# Patient Record
Sex: MALE | Race: WHITE | NOT HISPANIC OR LATINO | Employment: FULL TIME | URBAN - METROPOLITAN AREA
[De-identification: names, ages, dates, MRNs, and addresses within clinical notes are randomized per-mention and may not be internally consistent; named-entity substitution may affect disease eponyms.]

---

## 2017-08-03 ENCOUNTER — TRANSFERRED RECORDS (OUTPATIENT)
Dept: HEALTH INFORMATION MANAGEMENT | Facility: CLINIC | Age: 57
End: 2017-08-03

## 2017-10-17 ENCOUNTER — PRE VISIT (OUTPATIENT)
Dept: UROLOGY | Facility: CLINIC | Age: 57
End: 2017-10-17

## 2017-10-23 ENCOUNTER — ALLIED HEALTH/NURSE VISIT (OUTPATIENT)
Dept: UROLOGY | Facility: CLINIC | Age: 57
End: 2017-10-23

## 2017-10-23 ENCOUNTER — OFFICE VISIT (OUTPATIENT)
Dept: UROLOGY | Facility: CLINIC | Age: 57
End: 2017-10-23

## 2017-10-23 VITALS
HEIGHT: 72 IN | WEIGHT: 214 LBS | SYSTOLIC BLOOD PRESSURE: 140 MMHG | BODY MASS INDEX: 28.99 KG/M2 | DIASTOLIC BLOOD PRESSURE: 81 MMHG | HEART RATE: 89 BPM

## 2017-10-23 DIAGNOSIS — N39.3 MALE URINARY STRESS INCONTINENCE: Primary | ICD-10-CM

## 2017-10-23 RX ORDER — LOSARTAN POTASSIUM 50 MG/1
TABLET ORAL DAILY
COMMUNITY

## 2017-10-23 RX ORDER — CEFAZOLIN SODIUM 1 G/3ML
1 INJECTION, POWDER, FOR SOLUTION INTRAMUSCULAR; INTRAVENOUS SEE ADMIN INSTRUCTIONS
Status: CANCELLED | OUTPATIENT
Start: 2017-10-23

## 2017-10-23 RX ORDER — SUMATRIPTAN 50 MG/1
TABLET, FILM COATED ORAL
COMMUNITY

## 2017-10-23 RX ORDER — IBUPROFEN 400 MG/1
TABLET, FILM COATED ORAL
COMMUNITY

## 2017-10-23 ASSESSMENT — PAIN SCALES - GENERAL: PAINLEVEL: NO PAIN (0)

## 2017-10-23 NOTE — PROGRESS NOTES
NEW CONSULT FOR MALE URINARY INCONTINENCE    Name: Jesus Landa    MRN: [id number]   YOB: 1960                 Chief Complaint:   Urinary Incontinence          History of Present Illness:   Mr. Jesus Landa is a 57 year old male seen in consultation from Dr. Sparks for urinary incontinence secondary to robotic radical prostatectomy on 2/1/13 with Dr. Sparks (path: pT2cN0) and salvage EBRT 1.5 years later. PSA is nondetectable. Incontinence is Stress. He manages the incontinence with 3 large pads (he uses towels) per day. Patient does not need to void by Crede or straining. He is not dry at night. He can not voluntarily interrupt his stream when urinating in a toilet. Activities which exacerbate incontinence include bending, coughing, lifting, standing, walking, laughing, sneezing. Previous surgeries for incontinence include none. There is not a history of bladder neck contracture. His expectations for incontinence after treatment with us are: to be improved but not dry.     Although he goes through 2-3 shop towels per day, he says that it would be much worse if he did not purposely dehydrate himself. So, he characterizes his leakage as at least moderate if not severe.         Past Medical History:     Past Medical History:   Diagnosis Date     Colon polyps      Diverticulosis      Hyperlipidemia      Hypertension      Left cornea abrasion      Prostate cancer (H)      Tympanic membrane perforation             Past Surgical History:     Past Surgical History:   Procedure Laterality Date     amputation of index finger[      with neurectomy each right finger tip     C ORAL SURGERY PROCEDURE       CHOLECYSTECTOMY       COLONOSCOPY       DAVINCI PROSTATECTOMY  2/1/2013    Procedure: DAVINCI PROSTATECTOMY;  Davinci Radical  Prostatectomy, Bilateral Pelvic Lymhadenectomy  ;  Surgeon: Idania Sparks MD;  Location: [facility] OR      COLONOSCOPY THRU STOMA W BIOPSY/CAUTERY TUMOR/POLYP/LESION              Social  History:     Social History   Substance Use Topics     Smoking status: Never Smoker     Smokeless tobacco: Never Used     Alcohol use No            Family History:   No family history on file.         Allergies:   No Known Allergies         Medications:     Current Outpatient Prescriptions   Medication Sig     sildenafil (VIAGRA) 100 MG tablet Take 1/2 tab three times a week     oxyCODONE-acetaminophen (PERCOCET) 5-325 MG per tablet Take 1-2 tablets by mouth every 4 hours as needed.     sennosides (SENOKOT) 8.6 MG tablet Take 1 tablet by mouth 2 times daily as needed for constipation.     aspirin 81 MG tablet Take  by mouth daily.     No current facility-administered medications for this visit.              Review of Systems:    ROS: 14 point ROS neg other than the symptoms noted above in the HPI.          Physical Exam:   B/P: Data Unavailable, T: Data Unavailable, P: Data Unavailable, R: Data Unavailable  Estimated body mass index is 28.94 kg/(m^2) as calculated from the following:    Height as of 3/20/13: 1.829 m (6').    Weight as of 3/20/13: 96.8 kg (213 lb 6.4 oz).  General: age-appropriate appearing male in NAD. thin body habitus.  HEENT: Head AT/NC, EOMI, CN Grossly intact  Resp: no respiratory distress  Back: bony spine is non-tender, flanks are nontender  Abdomen: not obese, soft, non-distended, non-tender. No organomegaly  : testicles normal without atrophy or masses  Rectal exam: not done  LE: no edema.   Neuro: grossly intact  Motor: excellent strength throughout  Skin: clear of rashes or ecchymoses.        Labs:    All laboratory data reviewed with patient  Significant for none      Office Studies:           Imaging:    All imaging reviewed with patient.  Significant for none      Outside records:    I spent 10 minutes reviewing outside records.         Assessment and Plan:   57 year old male with male stress urinary incontinence .  He understands the risks to include but not be limited to bleeding,  infection, penile or scrotal pain/numbness, AUS infection, cuff erosion, urinary retention, sub-suff atrophy, persistent incontinence and need for additional procedures. He understands there is a 25-40% 10-year revision rate. He understands I consult for AMS. He wishes to proceed.    No orders of the defined types were placed in this encounter.    Patient was seen and examined with Dr. Stephen Mercado MD  Urology Resident     =======================================================  As the attending surgeon I, Rm Mitchell, interviewed and examined the patient. The plan was developed between me and the patient. My findings and plan are as stated by the resident.    We will need to do a cysto at the start of the case.     Rm Mitchell MD

## 2017-10-23 NOTE — MR AVS SNAPSHOT
After Visit Summary   10/23/2017    Jesus Landa    MRN: [id number]           Patient Information     Date Of Birth          1960        Visit Information        Provider Department      10/23/2017 12:00 PM Nurse, [facility] and [facility] for Prostate and Urologic Cancers        Today's Diagnoses     Male urinary stress incontinence    -  1       Follow-ups after your visit        Your next 10 appointments already scheduled     Dec 15, 2017   Procedure with Rm Mitchell MD   [facility] ([facility] [place])    [address] [place] [address]  5th [facility] [address] [phone]           Located in the Clinics and Surgery Center at [address] [place] [address].   parking is very convenient and highly recommended.  is a $6 flat rate fee.  Both  and self parkers should enter the main arrival plaza from [facility]; parking attendants will direct you based on your parking preference.            Jan 08, 2018  1:00 PM CST   (Arrive by 12:45 PM)   Post-Op with Boy Aly MD   [facility] and [facility] for Prostate and Urologic Cancers ([facility])    [address] [place] [address]  4th [facility] [address] [phone]              Who to contact     Please call your clinic at [phone] to:    Ask questions about your health    Make or cancel appointments    Discuss your medicines    Learn about your test results    Speak to your doctor   If you have compliments or concerns about an experience at your clinic, or if you wish to file a complaint, please contact [facility] Patient Relations at [phone] or email us at [email or web address]         Additional Information About Your Visit        MyChart Information     [employer] gives you secure access to your electronic health record. If you see a primary care provider, you can  also send messages to your care team and make appointments. If you have questions, please call your primary care clinic.  If you do not have a primary care provider, please call [phone] and they will assist you.      [employer] is an electronic gateway that provides easy, online access to your medical records. With [employer], you can request a clinic appointment, read your test results, renew a prescription or communicate with your care team.     To access your existing account, please contact your [facility] or call [phone] for assistance.        Care EveryWhere ID     This is your Care EveryWhere ID. This could be used by other organizations to access your [place] medical records  [id number]         Blood Pressure from Last 3 Encounters:   10/23/17 140/81   03/20/13 139/71   02/20/13 145/82    Weight from Last 3 Encounters:   10/23/17 97.1 kg (214 lb)   03/20/13 96.8 kg (213 lb 6.4 oz)   02/20/13 96.4 kg (212 lb 9.6 oz)              Today, you had the following     No orders found for display         Today's Medication Changes          These changes are accurate as of: 10/23/17  6:17 PM.  If you have any questions, ask your nurse or doctor.               Stop taking these medicines if you haven't already. Please contact your care team if you have questions.     oxyCODONE-acetaminophen 5-325 MG per tablet   Commonly known as:  PERCOCET   Stopped by:  Rm Mitchell MD           sennosides 8.6 MG tablet   Commonly known as:  SENOKOT   Stopped by:  Rm Mitchell MD           sildenafil 100 MG tablet   Commonly known as:  VIAGRA   Stopped by:  Rm Mitchell MD                    Primary Care Provider Office Phone # Fax #    [facility] [phone]       [address] [place] [address]        Equal Access to Services     AMANDA CHO AH: Abhinav Noble, sydni watson, qafortino johnson,  didier cadet maggy gupta'aan ah. So [facility] [phone].    ATENCIÓN: Si habla yeni, tiene a plata disposición servicios gratuitos de asistencia lingüística. Tanner al [phone].    We comply with applicable federal civil rights laws and [place] laws. We do not discriminate on the basis of race, color, national origin, age, disability, sex, sexual orientation, or gender identity.            Thank you!     Thank you for choosing [facility] UROLOGY AND [facility] FOR PROSTATE AND UROLOGIC CANCERS  for your care. Our goal is always to provide you with excellent care. Hearing back from our patients is one way we can continue to improve our services. Please take a few minutes to complete the written survey that you may receive in the mail after your visit with us. Thank you!             Your Updated Medication List - Protect others around you: Learn how to safely use, store and throw away your medicines at www.disposemymeds.org.          This list is accurate as of: 10/23/17  6:17 PM.  Always use your most recent med list.                   Brand Name Dispense Instructions for use Diagnosis    aspirin 81 MG tablet      Take  by mouth daily.        CENTRUM SILVER ULTRA MENS PO           ibuprofen 400 MG tablet    ADVIL/MOTRIN          losartan 50 MG tablet    COZAAR          SUMAtriptan 50 MG tablet    IMITREX          TYLENOL 325 MG Caps   Generic drug:  Acetaminophen

## 2017-10-23 NOTE — PATIENT INSTRUCTIONS
Schedule surgery with Dr. Mitchell.    It was a pleasure meeting with you today.  Thank you for allowing me and my team the privilege of caring for you today.  YOU are the reason we are here, and I truly hope we provided you with the excellent service you deserve.  Please let us know if there is anything else we can do for you so that we can be sure you are leaving completely satisfied with your care experience.

## 2017-10-23 NOTE — LETTER
10/23/2017       RE: Jesus Landa  [address] [address] [address]     Dear Colleague,    Thank you for referring your patient, Jesus Landa, to the [facility] UROLOGY AND INST FOR PROSTATE AND UROLOGIC CANCERS at [facility]. Please see a copy of my visit note below.    NEW CONSULT FOR MALE URINARY INCONTINENCE    Name: Jesus Landa    MRN: [id number]   YOB: 1960                 Chief Complaint:   Urinary Incontinence          History of Present Illness:   Mr. Jesus Landa is a 57 year old male seen in consultation from Dr. Sparks for urinary incontinence secondary to robotic radical prostatectomy on 2/1/13 with Dr. Sparks (path: pT2cN0) and salvage EBRT 1.5 years later. PSA is nondetectable. Incontinence is Stress. He manages the incontinence with 3 large pads (he uses towels) per day. Patient does not need to void by Crede or straining. He is not dry at night. He can not voluntarily interrupt his stream when urinating in a toilet. Activities which exacerbate incontinence include bending, coughing, lifting, standing, walking, laughing, sneezing. Previous surgeries for incontinence include none. There is not a history of bladder neck contracture. His expectations for incontinence after treatment with us are: to be improved but not dry.     Although he goes through 2-3 shop towels per day, he says that it would be much worse if he did not purposely dehydrate himself. So, he characterizes his leakage as at least moderate if not severe.         Past Medical History:     Past Medical History:   Diagnosis Date     Colon polyps      Diverticulosis      Hyperlipidemia      Hypertension      Left cornea abrasion      Prostate cancer (H)      Tympanic membrane perforation             Past Surgical History:     Past Surgical History:   Procedure Laterality Date     amputation of index finger[      with neurectomy each right finger tip     C ORAL SURGERY PROCEDURE        CHOLECYSTECTOMY       COLONOSCOPY       DAVINCI PROSTATECTOMY  2/1/2013    Procedure: DAVINCI PROSTATECTOMY;  Davinci Radical  Prostatectomy, Bilateral Pelvic Lymhadenectomy  ;  Surgeon: Idania Sparks MD;  Location: [facility] OR      COLONOSCOPY THRU STOMA W BIOPSY/CAUTERY TUMOR/POLYP/LESION              Social History:     Social History   Substance Use Topics     Smoking status: Never Smoker     Smokeless tobacco: Never Used     Alcohol use No            Family History:   No family history on file.         Allergies:   No Known Allergies         Medications:     Current Outpatient Prescriptions   Medication Sig     sildenafil (VIAGRA) 100 MG tablet Take 1/2 tab three times a week     oxyCODONE-acetaminophen (PERCOCET) 5-325 MG per tablet Take 1-2 tablets by mouth every 4 hours as needed.     sennosides (SENOKOT) 8.6 MG tablet Take 1 tablet by mouth 2 times daily as needed for constipation.     aspirin 81 MG tablet Take  by mouth daily.     No current facility-administered medications for this visit.              Review of Systems:    ROS: 14 point ROS neg other than the symptoms noted above in the HPI.          Physical Exam:   B/P: Data Unavailable, T: Data Unavailable, P: Data Unavailable, R: Data Unavailable  Estimated body mass index is 28.94 kg/(m^2) as calculated from the following:    Height as of 3/20/13: 1.829 m (6').    Weight as of 3/20/13: 96.8 kg (213 lb 6.4 oz).  General: age-appropriate appearing male in NAD. thin body habitus.  HEENT: Head AT/NC, EOMI, CN Grossly intact  Resp: no respiratory distress  Back: bony spine is non-tender, flanks are nontender  Abdomen: not obese, soft, non-distended, non-tender. No organomegaly  : testicles normal without atrophy or masses  Rectal exam: not done  LE: no edema.   Neuro: grossly intact  Motor: excellent strength throughout  Skin: clear of rashes or ecchymoses.        Labs:    All laboratory data reviewed with patient  Significant for none       Office Studies:           Imaging:    All imaging reviewed with patient.  Significant for none      Outside records:    I spent 10 minutes reviewing outside records.         Assessment and Plan:   57 year old male with male stress urinary incontinence .  He understands the risks to include but not be limited to bleeding, infection, penile or scrotal pain/numbness, AUS infection, cuff erosion, urinary retention, sub-suff atrophy, persistent incontinence and need for additional procedures. He understands there is a 25-40% 10-year revision rate. He understands I consult for AMS. He wishes to proceed.    No orders of the defined types were placed in this encounter.    Patient was seen and examined with Dr. Stephen Mercado MD  Urology Resident     =======================================================  As the attending surgeon I, Rm Mitchell, interviewed and examined the patient. The plan was developed between me and the patient. My findings and plan are as stated by the resident.    We will need to do a cysto at the start of the case.     Rm Mitchell MD

## 2017-10-23 NOTE — NURSING NOTE
Chief Complaint   Patient presents with     RECHECK     Patient with incontinence coming in for a consult.     Polina Trevizo

## 2017-10-23 NOTE — MR AVS SNAPSHOT
After Visit Summary   10/23/2017    Jesus Landa    MRN: [id number]           Patient Information     Date Of Birth          1960        Visit Information        Provider Department      10/23/2017 10:30 AM Rm Mitchell MD [facility] Urology and [facility] for Prostate and Urologic Cancers        Today's Diagnoses     Male urinary stress incontinence    -  1      Care Instructions    Schedule surgery with Dr. Mitchell.    It was a pleasure meeting with you today.  Thank you for allowing me and my team the privilege of caring for you today.  YOU are the reason we are here, and I truly hope we provided you with the excellent service you deserve.  Please let us know if there is anything else we can do for you so that we can be sure you are leaving completely satisfied with your care experience.              Follow-ups after your visit        Your next 10 appointments already scheduled     Dec 15, 2017   Procedure with Rm Mitchell MD   [facility] ([facility] [place])    [address] [place] [address]  5th [facility] [address] [phone]           Located in the Clinics and Surgery Center at [address] [place] [address].   parking is very convenient and highly recommended.  is a $6 flat rate fee.  Both  and self parkers should enter the main arrival plaza from [facility]; parking attendants will direct you based on your parking preference.            Jan 08, 2018  1:00 PM CST   (Arrive by 12:45 PM)   Post-Op with Boy Aly MD   [facility] Urology and [facility] for Prostate and Urologic Cancers ([facility] [place])    [address] [place] [address]  4th [facility] [address] [phone]              Who to contact     Please call your clinic at [phone] to:    Ask questions about your health    Make or cancel appointments    Discuss your medicines    Learn about your test results    Speak to your  doctor   If you have compliments or concerns about an experience at your clinic, or if you wish to file a complaint, please contact [facility] Physicians Patient Relations at [phone] or email us at [email or web address]         Additional Information About Your Visit        MyChart Information     [employer] gives you secure access to your electronic health record. If you see a primary care provider, you can also send messages to your care team and make appointments. If you have questions, please call your primary care clinic.  If you do not have a primary care provider, please call [phone] and they will assist you.      [employer] is an electronic gateway that provides easy, online access to your medical records. With [employer], you can request a clinic appointment, read your test results, renew a prescription or communicate with your care team.     To access your existing account, please contact your [facility] or call [phone] for assistance.        Care EveryWhere ID     This is your Care EveryWhere ID. This could be used by other organizations to access your [place] medical records  [id number]        Your Vitals Were     Pulse Height BMI (Body Mass Index)             89 1.829 m (6') 29.02 kg/m2          Blood Pressure from Last 3 Encounters:   10/23/17 140/81   03/20/13 139/71   02/20/13 145/82    Weight from Last 3 Encounters:   10/23/17 97.1 kg (214 lb)   03/20/13 96.8 kg (213 lb 6.4 oz)   02/20/13 96.4 kg (212 lb 9.6 oz)              We Performed the Following     Keke-Operative Worksheet  (Urology General)          Today's Medication Changes          These changes are accurate as of: 10/23/17  2:05 PM.  If you have any questions, ask your nurse or doctor.               Stop taking these medicines if you haven't already. Please contact your care team if you have questions.     oxyCODONE-acetaminophen 5-325 MG per tablet   Commonly known as:   PERCOCET   Stopped by:  Rm Mitchell MD           sennosides 8.6 MG tablet   Commonly known as:  SENOKOT   Stopped by:  Rm Mitchell MD           sildenafil 100 MG tablet   Commonly known as:  VIAGRA   Stopped by:  Rm Mitchell MD                    Primary Care Provider Office Phone # Fax #    [facility] [phone]       [address] [place] [address]        Equal Access to Services     [facility]: Hadii aad ku hadasho Soomaali, waaxda luqadaha, qaybta kaalmada adeegyada, waxay idiin hayaan adeeg kharash la'aan ah. So [facility] [phone].    ATENCIÓN: Si habla español, tiene a plata disposición servicios gratuitos de asistencia lingüística. [facility] [phone].    We comply with applicable federal civil rights laws and [place] laws. We do not discriminate on the basis of race, color, national origin, age, disability, sex, sexual orientation, or gender identity.            Thank you!     Thank you for choosing [facility] UROLOGY AND [facility] FOR PROSTATE AND UROLOGIC CANCERS  for your care. Our goal is always to provide you with excellent care. Hearing back from our patients is one way we can continue to improve our services. Please take a few minutes to complete the written survey that you may receive in the mail after your visit with us. Thank you!             Your Updated Medication List - Protect others around you: Learn how to safely use, store and throw away your medicines at www.disposemymeds.org.          This list is accurate as of: 10/23/17  2:05 PM.  Always use your most recent med list.                   Brand Name Dispense Instructions for use Diagnosis    aspirin 81 MG tablet      Take  by mouth daily.        CENTRUM SILVER ULTRA MENS PO           ibuprofen 400 MG tablet    ADVIL/MOTRIN          losartan 50 MG tablet    COZAAR          SUMAtriptan 50 MG tablet    IMITREX          TYLENOL 325 MG Caps   Generic drug:  Acetaminophen

## 2017-10-23 NOTE — PROGRESS NOTES
Pre Op Teaching Flowsheet       Pre and Post op Patient Education  Relevant Diagnosis:  Male stress urinary incontinence  Teaching Topic:  Pre and post op teaching for cystoscopy, insertion of artificial urinary sphincter  Person Involved in teaching:  Jesus Landa and spouse      Motivation Level:  Asks Questions: Yes  Eager to Learn:  Yes  Cooperative: Yes  Receptive (willing/able to accept information):  Yes  Patient demonstrates understanding of the following:  Date and time of surgery:  12.15.17 at 1330  Location of surgery: [facility]- 5th Floor  History and Physical and any other testing necessary prior to surgery: Yes  Required time line for completion of History and Physical and any pre-op testing: Yes    NPO Guidelines: NPO after midnight    Patient demonstrates understanding of the following:  Pre-op bowel prep: no, not needed  Pre-op showering/scrub information with Hibiclens Soap: Yes  Medications to take the day of surgery:  Per PCP  Blood thinner medications discussed and when to stop (if applicable):  Yes  Diabetes medication management (if applicable):  N/A  Discussed pain control after surgery: pain scale, pain medications and pain management techniques  Infection Prevention: Patient demonstrates understanding of the following:  Patient instructed on hand hygiene:  Yes  Surgical procedure site care taught: Yes  Signs and symptoms of infection taught:  Yes  Wound care will be taught at the time of discharge.  Central venous catheter care will be taught at the time of discharge (if applicable).    Post-op follow-up:  Discussed how to contact the hospital, nurse, and clinic scheduling staff if necessary.    Instructional materials used/given/mailed:  [place] Surgery Booklet, post op teaching sheet, Map, Soap, and arrival/location information.    Surgical instructions given to patient in clinic: Yes.    Instructional Materials given:  Before your surgery packet ,  Medications to avoid before surgery , Showering or Bathing instructions before surgery  and What to expect after surgery    Post-op appointment/testing scheduled per MD orders: Yes, 1.8.17 at 1300 with Dr Aly    Total time with patient: 10 minutes    Kyra Otero, RN-BC, BSN  Urology Care Coordinator

## 2017-11-27 ENCOUNTER — TRANSFERRED RECORDS (OUTPATIENT)
Dept: HEALTH INFORMATION MANAGEMENT | Facility: CLINIC | Age: 57
End: 2017-11-27

## 2017-12-01 ENCOUNTER — CARE COORDINATION (OUTPATIENT)
Dept: UROLOGY | Facility: CLINIC | Age: 57
End: 2017-12-01

## 2017-12-01 DIAGNOSIS — N39.3 MALE URINARY STRESS INCONTINENCE: Primary | ICD-10-CM

## 2017-12-01 NOTE — PROGRESS NOTES
Upcoming surgical procedure with Dr Rm Mitchell on 12.15.17 at 1320, check in at 1145.   Surgery at ([facility] or [place]): [facility]  Patient is having a Insertion of artifical urinary sphincter; cystoscopy  Pre-op physical completed: YES. Date-11.27.17.  PAC: No  Bowel Prep: No, not needed  Urine culture completed: YES. Date-12.4.17.        Post-operative appointment needed: YES. Date-1.8.18 at 1600 with Dr Mitchell.    12.1.17. Spoke with patient. Urine Culture order sent to: [facility]  All questions answered.    Patient verbalized understanding. No further questions.      Kyra Otero, RN-BC, BSN  Care Coordinator - Reconstructive Urology  [phone]

## 2017-12-04 ENCOUNTER — TRANSFERRED RECORDS (OUTPATIENT)
Dept: HEALTH INFORMATION MANAGEMENT | Facility: CLINIC | Age: 57
End: 2017-12-04

## 2017-12-14 ENCOUNTER — ANESTHESIA EVENT (OUTPATIENT)
Dept: SURGERY | Facility: AMBULATORY SURGERY CENTER | Age: 57
End: 2017-12-14

## 2017-12-15 ENCOUNTER — ANESTHESIA (OUTPATIENT)
Dept: SURGERY | Facility: AMBULATORY SURGERY CENTER | Age: 57
End: 2017-12-15

## 2017-12-15 ENCOUNTER — HOSPITAL ENCOUNTER (OUTPATIENT)
Facility: AMBULATORY SURGERY CENTER | Age: 57
End: 2017-12-15
Attending: UROLOGY
Payer: COMMERCIAL

## 2017-12-15 ENCOUNTER — SURGERY (OUTPATIENT)
Age: 57
End: 2017-12-15

## 2017-12-15 VITALS
HEART RATE: 82 BPM | RESPIRATION RATE: 14 BRPM | TEMPERATURE: 97.7 F | DIASTOLIC BLOOD PRESSURE: 88 MMHG | OXYGEN SATURATION: 98 % | SYSTOLIC BLOOD PRESSURE: 140 MMHG

## 2017-12-15 DIAGNOSIS — N39.3 STRESS INCONTINENCE OF URINE: Primary | ICD-10-CM

## 2017-12-15 DEVICE — IMP URINARY SPHINCTER AMS 800 PUMP SYSTEM 72404127
Type: IMPLANTABLE DEVICE | Site: SCROTUM | Status: NON-FUNCTIONAL
Removed: 2020-01-02

## 2017-12-15 DEVICE — KIT ACCESSORY UCS AMS 800 720066-01: Type: IMPLANTABLE DEVICE | Site: SCROTUM | Status: FUNCTIONAL

## 2017-12-15 DEVICE — IMPLANTABLE DEVICE
Type: IMPLANTABLE DEVICE | Site: URETHRA | Status: NON-FUNCTIONAL
Removed: 2020-01-02

## 2017-12-15 DEVICE — IMP URINARY SPHINCTER BALLOON AMS 61-70CM 72400024
Type: IMPLANTABLE DEVICE | Site: ABDOMEN | Status: NON-FUNCTIONAL
Removed: 2020-01-02

## 2017-12-15 RX ORDER — PROPOFOL 10 MG/ML
INJECTION, EMULSION INTRAVENOUS PRN
Status: DISCONTINUED | OUTPATIENT
Start: 2017-12-15 | End: 2017-12-15

## 2017-12-15 RX ORDER — AMOXICILLIN 250 MG
1-2 CAPSULE ORAL 2 TIMES DAILY
Qty: 100 TABLET | Refills: 0 | Status: SHIPPED | OUTPATIENT
Start: 2017-12-15 | End: 2018-01-08

## 2017-12-15 RX ORDER — ONDANSETRON 2 MG/ML
4 INJECTION INTRAMUSCULAR; INTRAVENOUS EVERY 30 MIN PRN
Status: DISCONTINUED | OUTPATIENT
Start: 2017-12-15 | End: 2017-12-16 | Stop reason: HOSPADM

## 2017-12-15 RX ORDER — EPHEDRINE SULFATE 50 MG/ML
INJECTION, SOLUTION INTRAMUSCULAR; INTRAVENOUS; SUBCUTANEOUS PRN
Status: DISCONTINUED | OUTPATIENT
Start: 2017-12-15 | End: 2017-12-15

## 2017-12-15 RX ORDER — ONDANSETRON 4 MG/1
4 TABLET, ORALLY DISINTEGRATING ORAL EVERY 30 MIN PRN
Status: DISCONTINUED | OUTPATIENT
Start: 2017-12-15 | End: 2017-12-16 | Stop reason: HOSPADM

## 2017-12-15 RX ORDER — LIDOCAINE HYDROCHLORIDE 20 MG/ML
INJECTION, SOLUTION INFILTRATION; PERINEURAL PRN
Status: DISCONTINUED | OUTPATIENT
Start: 2017-12-15 | End: 2017-12-15

## 2017-12-15 RX ORDER — GABAPENTIN 300 MG/1
300 CAPSULE ORAL ONCE
Status: COMPLETED | OUTPATIENT
Start: 2017-12-15 | End: 2017-12-15

## 2017-12-15 RX ORDER — FENTANYL CITRATE 50 UG/ML
INJECTION, SOLUTION INTRAMUSCULAR; INTRAVENOUS PRN
Status: DISCONTINUED | OUTPATIENT
Start: 2017-12-15 | End: 2017-12-15

## 2017-12-15 RX ORDER — ONDANSETRON 2 MG/ML
INJECTION INTRAMUSCULAR; INTRAVENOUS PRN
Status: DISCONTINUED | OUTPATIENT
Start: 2017-12-15 | End: 2017-12-15

## 2017-12-15 RX ORDER — SODIUM CHLORIDE, SODIUM LACTATE, POTASSIUM CHLORIDE, CALCIUM CHLORIDE 600; 310; 30; 20 MG/100ML; MG/100ML; MG/100ML; MG/100ML
INJECTION, SOLUTION INTRAVENOUS CONTINUOUS
Status: DISCONTINUED | OUTPATIENT
Start: 2017-12-15 | End: 2017-12-15 | Stop reason: HOSPADM

## 2017-12-15 RX ORDER — MEPERIDINE HYDROCHLORIDE 25 MG/ML
12.5 INJECTION INTRAMUSCULAR; INTRAVENOUS; SUBCUTANEOUS
Status: DISCONTINUED | OUTPATIENT
Start: 2017-12-15 | End: 2017-12-16 | Stop reason: HOSPADM

## 2017-12-15 RX ORDER — FENTANYL CITRATE 50 UG/ML
25-50 INJECTION, SOLUTION INTRAMUSCULAR; INTRAVENOUS EVERY 5 MIN PRN
Status: DISCONTINUED | OUTPATIENT
Start: 2017-12-15 | End: 2017-12-15 | Stop reason: HOSPADM

## 2017-12-15 RX ORDER — DEXAMETHASONE SODIUM PHOSPHATE 4 MG/ML
INJECTION, SOLUTION INTRA-ARTICULAR; INTRALESIONAL; INTRAMUSCULAR; INTRAVENOUS; SOFT TISSUE PRN
Status: DISCONTINUED | OUTPATIENT
Start: 2017-12-15 | End: 2017-12-15

## 2017-12-15 RX ORDER — NALOXONE HYDROCHLORIDE 0.4 MG/ML
.1-.4 INJECTION, SOLUTION INTRAMUSCULAR; INTRAVENOUS; SUBCUTANEOUS
Status: DISCONTINUED | OUTPATIENT
Start: 2017-12-15 | End: 2017-12-16 | Stop reason: HOSPADM

## 2017-12-15 RX ORDER — FENTANYL CITRATE 50 UG/ML
25-50 INJECTION, SOLUTION INTRAMUSCULAR; INTRAVENOUS
Status: DISCONTINUED | OUTPATIENT
Start: 2017-12-15 | End: 2017-12-16 | Stop reason: HOSPADM

## 2017-12-15 RX ORDER — SODIUM CHLORIDE, SODIUM LACTATE, POTASSIUM CHLORIDE, CALCIUM CHLORIDE 600; 310; 30; 20 MG/100ML; MG/100ML; MG/100ML; MG/100ML
INJECTION, SOLUTION INTRAVENOUS CONTINUOUS
Status: DISCONTINUED | OUTPATIENT
Start: 2017-12-15 | End: 2017-12-16 | Stop reason: HOSPADM

## 2017-12-15 RX ORDER — PROPOFOL 10 MG/ML
INJECTION, EMULSION INTRAVENOUS CONTINUOUS PRN
Status: DISCONTINUED | OUTPATIENT
Start: 2017-12-15 | End: 2017-12-15

## 2017-12-15 RX ORDER — ACETAMINOPHEN 325 MG/1
975 TABLET ORAL ONCE
Status: COMPLETED | OUTPATIENT
Start: 2017-12-15 | End: 2017-12-15

## 2017-12-15 RX ORDER — KETOROLAC TROMETHAMINE 30 MG/ML
30 INJECTION, SOLUTION INTRAMUSCULAR; INTRAVENOUS EVERY 6 HOURS PRN
Status: DISCONTINUED | OUTPATIENT
Start: 2017-12-15 | End: 2017-12-16 | Stop reason: HOSPADM

## 2017-12-15 RX ORDER — ACETAMINOPHEN 325 MG/1
650 TABLET ORAL EVERY 4 HOURS PRN
Qty: 100 TABLET | Refills: 0 | Status: SHIPPED | OUTPATIENT
Start: 2017-12-15

## 2017-12-15 RX ORDER — OXYCODONE HYDROCHLORIDE 5 MG/1
5-10 TABLET ORAL EVERY 6 HOURS PRN
Qty: 30 TABLET | Refills: 0 | Status: SHIPPED | OUTPATIENT
Start: 2017-12-15 | End: 2018-01-08

## 2017-12-15 RX ADMIN — ACETAMINOPHEN 975 MG: 325 TABLET ORAL at 07:22

## 2017-12-15 RX ADMIN — FENTANYL CITRATE 50 MCG: 50 INJECTION, SOLUTION INTRAMUSCULAR; INTRAVENOUS at 08:26

## 2017-12-15 RX ADMIN — LIDOCAINE HYDROCHLORIDE 80 MG: 20 INJECTION, SOLUTION INFILTRATION; PERINEURAL at 08:29

## 2017-12-15 RX ADMIN — PROPOFOL 300 MG: 10 INJECTION, EMULSION INTRAVENOUS at 08:29

## 2017-12-15 RX ADMIN — ONDANSETRON 4 MG: 2 INJECTION INTRAMUSCULAR; INTRAVENOUS at 08:35

## 2017-12-15 RX ADMIN — PROPOFOL 150 MCG/KG/MIN: 10 INJECTION, EMULSION INTRAVENOUS at 08:29

## 2017-12-15 RX ADMIN — DEXAMETHASONE SODIUM PHOSPHATE 4 MG: 4 INJECTION, SOLUTION INTRA-ARTICULAR; INTRALESIONAL; INTRAMUSCULAR; INTRAVENOUS; SOFT TISSUE at 08:35

## 2017-12-15 RX ADMIN — FENTANYL CITRATE 50 MCG: 50 INJECTION, SOLUTION INTRAMUSCULAR; INTRAVENOUS at 09:18

## 2017-12-15 RX ADMIN — FENTANYL CITRATE 50 MCG: 50 INJECTION, SOLUTION INTRAMUSCULAR; INTRAVENOUS at 08:39

## 2017-12-15 RX ADMIN — EPHEDRINE SULFATE 5 MG: 50 INJECTION, SOLUTION INTRAMUSCULAR; INTRAVENOUS; SUBCUTANEOUS at 08:52

## 2017-12-15 RX ADMIN — SODIUM CHLORIDE, SODIUM LACTATE, POTASSIUM CHLORIDE, CALCIUM CHLORIDE: 600; 310; 30; 20 INJECTION, SOLUTION INTRAVENOUS at 08:25

## 2017-12-15 RX ADMIN — GABAPENTIN 300 MG: 300 CAPSULE ORAL at 07:22

## 2017-12-15 RX ADMIN — Medication 0.5 MG: at 09:02

## 2017-12-15 NOTE — ANESTHESIA POSTPROCEDURE EVALUATION
Patient: Jesus Landa    Procedure(s):  Insertion of Artificial Urinary Sphincter; Cystoscopy - Wound Class: I-Clean   - Wound Class: II-Clean Contaminated    Diagnosis:Male Stress Urinary Incontinence  Diagnosis Additional Information: No value filed.    Anesthesia Type:  General    Note:  Anesthesia Post Evaluation    Patient location during evaluation: PACU  Patient participation: Able to fully participate in evaluation  Level of consciousness: awake and alert  Pain management: adequate  Airway patency: patent  Cardiovascular status: hemodynamically stable  Respiratory status: acceptable  Hydration status: stable  PONV: none     Anesthetic complications: None          Last vitals:  Vitals:    12/15/17 1030 12/15/17 1039 12/15/17 1055   BP: (!) 122/96 133/72 140/88   Pulse:      Resp: 10 14    Temp:  36.6  C (97.8  F) 36.5  C (97.7  F)   SpO2: 96% 95% 98%         Electronically Signed By: Arya Ocampo MD  December 15, 2017  11:54 AM

## 2017-12-15 NOTE — IP AVS SNAPSHOT
MRN:[id number]                      After Visit Summary   12/15/2017    Jesus Landa    MRN: [id number]           Thank you!     Thank you for choosing [facility] for your care. Our goal is always to provide you with excellent care. Hearing back from our patients is one way we can continue to improve our services. Please take a few minutes to complete the written survey that you may receive in the mail after you visit with us. Thank you!        Patient Information     Date Of Birth          1960        About your hospital stay     You were admitted on:  December 15, 2017 [facility]    You were discharged on:  December 15, 2017       Who to Call     For medical emergencies, please call 911.  For non-urgent questions about your medical care, please call your primary care provider or clinic, [phone]  For questions related to your surgery, please call your surgery clinic        Attending Provider     Provider Rm Perry MD Urology       Primary Care Provider Office Phone # Fax #    [facility] [phone]      After Care Instructions     Diet Instructions       Resume pre procedure diet            Discharge Instructions       AUS Instructions:  Leave the pump deactivated for the next 3 weeks until you are given approval from Dr. Mitchell's team to do so. Your wounds need time to heal before you can safely start to use the device. Avoid any type of sexual activity until after you are seen in the clinic.    Activity:   No lifting over 15 pounds and no strenuous physical activity for 4 weeks   Do NOT strain with bowel movements.  No soaking/baths/swimming for 4 weeks. Showers are fine. Just let soapy water run over your incisions.    Diet Instructions: Resume regular diet     Dressing Comments: May remove dressings tomorrow and shower 24 hours after surgery. Keep dressing clean and dry -  replace if necessary to protect clothing.     Medications:  - Transition from narcotic pain medications to tylenol (acetaminophen) as you are able.  Wean yourself off all pain medications as you are able.  - Some pain medications contain both tylenol (acetaminophen) and a narcotic (Norco, vicodin, percocet), do not take more than 4,000mg of Tylenol (acetaminophen) from all sources in any 24 hour period.  - Narcotics can make you constipated.  Take over the counter fiber (metamucil or benefiber) and stool softeners (miralax, docusate or senna) while taking narcotic pain medications, but stop if you develop diarrhea.  - No driving or operating machinery while taking narcotic pain medications     Follow-Up:  - Follow up in three weeks' time or as scheduled in the urology clinic to see Dr. Mitchell  - Call or return sooner than your regularly scheduled visit if you develop any of the following: fever, uncontrolled pain, uncontrolled nausea or vomiting, as well as increased redness, swelling, or drainage from your wound. You may call the Urology Clinic during daytime hours at [phone]. If after hours, call [phone] and ask to speak with the Urology resident on call.            Encourage fluids       Encourage fluids at home to keep urine clear to light pink            No Alcohol       for 24 hours post procedure            No driving or operating machinery        until the day after procedure                  Your next 10 appointments already scheduled     Jan 08, 2018  4:00 PM CST   (Arrive by 3:45 PM)   Post-Op with Rm Mitchell MD   [facility] ([facility])    [address] [address] [phone]              Further instructions from your care team       [facility]  Home Care Following Anesthesia  For 24 hours after surgery:  1. Get plenty of rest.  A  responsible adult must stay with you for at least 24 hours after you leave the surgery center.  2. Do not drive or use heavy equipment.  If you have weakness or tingling, don't drive or use heavy equipment until this feeling goes away.   3. Do not drink alcohol.   4. Avoid strenuous or risky activities.  Ask for help when climbing stairs.  5. You may feel lightheaded.  IF so, sit for a few minutes before standing.  Have someone help you get up.   6. If you have nausea (feel sick to your stomach): Drink only clear liquids such as apple juice, ginger ale, broth or 7-Up.  Rest may also help.  Be sure to drink enough fluids.  Move to a regular diet as you feel able.   7. You may have a slight fever.  Call the doctor if your fever is over 100 F (37.7 C) (taken under the tongue) or lasts longer than 24 hours.  8. You may have a dry mouth, a sore throat, muscle aches or trouble sleeping. These should go away after 24 hours.  9. Do not make important or legal decisions.               Tips for taking pain medications  To get the best pain relief possible, remember these points:    Take pain medications as directed, before pain becomes severe.    Pain medication can upset your stomach: taking it with food may help.    Constipation is a common side effect of pain medication. Drink plenty of  fluids.    Eat foods high in fiber. Take a stool softener if recommended by your doctor or pharmacist.    Do not drink alcohol, drive or operate machinery while taking pain medications.    Ask about other ways to control pain, such as with heat, ice or relaxation.    Tylenol/Acetaminophen Consumption  To help encourage the safe use of acetaminophen, the makers of TYLENOL  have lowered the maximum daily dose for single-ingredient Extra Strength TYLENOL  (acetaminophen) products sold in the [place] from 8 pills per day (4,000 mg) to 6 pills per day (3,000 mg). The dosing interval has also changed from 2 pills every 4-6 hours to 2 pills every 6  hours.    If you feel your pain relief is insufficient, you may take Tylenol/Acetaminophen in addition to your narcotic pain medication.     Be careful not to exceed 3,000 mg of Tylenol/Acetaminophen in a 24 hour period from all sources.    If you are taking extra strength Tylenol/acetaminophen (500 mg), the maximum dose is 6 tablets in 24 hours.    If you are taking regular strength acetaminophen (325 mg), the maximum dose is 9 tablets in 24 hours.    Call a doctor for any of the followin. Signs of infection (fever, growing tenderness at the surgery site, a large amount of drainage or bleeding, severe pain, foul-smelling drainage, redness, swelling).  2. It has been over 8 to 10 hours since surgery and you are still not able to urinate (pass water).  3. Headache for over 24 hours.  4. Numbness, tingling or weakness the day after surgery (if you had spinal anesthesia).  Your doctor is:  Dr. Rm Sosa, Prostate and Urology: [phone]                    Or dial [phone] and ask for the resident on call for:  Prostate Urology  For emergency care, call the:  [facility]:  [phone] (TTY for hearing impaired: [phone])                Pending Results     No orders found from 2017 to 2017.            Admission Information     Date & Time Provider Department Dept. Phone    12/15/2017 Rm Mitchell MD [facility] [phone]      [facility] Information     [employer] gives you secure access to your electronic health record. If you see a primary care provider, you can also send messages to your care team and make appointments. If you have questions, please call your primary care clinic.  If you do not have a primary care provider, please call [phone] and they will assist you.      [employer] is an electronic gateway that provides easy, online access to your medical records. With [employer], you can request a clinic appointment, read your test  results, renew a prescription or communicate with your care team.     To access your existing account, please contact your [facility] or call [phone] for assistance.        Care EveryWhere ID     This is your Care EveryWhere ID. This could be used by other organizations to access your [place] medical records  [id number]        Equal Access to Services     AMANDA CHO : Hadstacia de la cruz hadmonikao Sofelisaali, waaxda luqadaha, qaybta kaalmada lexiegikatlyn, didier longoriajennifermiguelina lopez . [facility] [phone].    ATENCIÓN: Si habla español, tiene a plata disposición servicios gratuitos de asistencia lingüística. Tanner al [phone].    We comply with applicable federal civil rights laws and [place] laws. We do not discriminate on the basis of race, color, national origin, age, disability, sex, sexual orientation, or gender identity.               Review of your medicines      START taking        Dose / Directions    oxyCODONE IR 5 MG tablet   Commonly known as:  ROXICODONE   Used for:  Stress incontinence of urine        Dose:  5-10 mg   Take 1-2 tablets (5-10 mg) by mouth every 6 hours as needed for other (Moderate to Severe Pain)   Quantity:  30 tablet   Refills:  0       senna-docusate 8.6-50 MG per tablet   Commonly known as:  SENOKOT-S;PERICOLACE   Used for:  Stress incontinence of urine        Dose:  1-2 tablet   Take 1-2 tablets by mouth 2 times daily To be taken with opioid (narcotic) pain medication to prevent constipation.   Quantity:  100 tablet   Refills:  0         CONTINUE these medicines which may have CHANGED, or have new prescriptions. If we are uncertain of the size of tablets/capsules you have at home, strength may be listed as something that might have changed.        Dose / Directions    * TYLENOL 325 MG Caps   This may have changed:  Another medication with the same name was added. Make sure you understand how and when to take each.   Generic drug:   Acetaminophen        Refills:  0       * acetaminophen 325 MG tablet   Commonly known as:  TYLENOL   This may have changed:  You were already taking a medication with the same name, and this prescription was added. Make sure you understand how and when to take each.   Used for:  Stress incontinence of urine        Dose:  650 mg   Take 2 tablets (650 mg) by mouth every 4 hours as needed for other (mild pain)   Quantity:  100 tablet   Refills:  0       * Notice:  This list has 2 medication(s) that are the same as other medications prescribed for you. Read the directions carefully, and ask your doctor or other care provider to review them with you.      CONTINUE these medicines which have NOT CHANGED        Dose / Directions    aspirin 81 MG tablet        Take  by mouth daily.   Refills:  0       CENTRUM SILVER ULTRA MENS PO        Refills:  0       ibuprofen 400 MG tablet   Commonly known as:  ADVIL/MOTRIN        Refills:  0       losartan 50 MG tablet   Commonly known as:  COZAAR        Refills:  0       SUMAtriptan 50 MG tablet   Commonly known as:  IMITREX        Refills:  0            Where to get your medicines      These medications were sent to [address] [address]    Hours:  TRANSPLANT PHONE NUMBER [phone] Phone:  [phone]     acetaminophen 325 MG tablet    senna-docusate 8.6-50 MG per tablet         Some of these will need a paper prescription and others can be bought over the counter. Ask your nurse if you have questions.     Bring a paper prescription for each of these medications     oxyCODONE IR 5 MG tablet                Protect others around you: Learn how to safely use, store and throw away your medicines at www.disposemymeds.org.             Medication List: This is a list of all your medications and when to take them. Check marks below indicate your daily home schedule. Keep this  list as a reference.      Medications           Morning Afternoon Evening Bedtime As Needed    aspirin 81 MG tablet   Take  by mouth daily.                                CENTRUM SILVER ULTRA MENS PO                                ibuprofen 400 MG tablet   Commonly known as:  ADVIL/MOTRIN                                losartan 50 MG tablet   Commonly known as:  COZAAR                                oxyCODONE IR 5 MG tablet   Commonly known as:  ROXICODONE   Take 1-2 tablets (5-10 mg) by mouth every 6 hours as needed for other (Moderate to Severe Pain)                                senna-docusate 8.6-50 MG per tablet   Commonly known as:  SENOKOT-S;PERICOLACE   Take 1-2 tablets by mouth 2 times daily To be taken with opioid (narcotic) pain medication to prevent constipation.                                SUMAtriptan 50 MG tablet   Commonly known as:  IMITREX                                * TYLENOL 325 MG Caps   Generic drug:  Acetaminophen                                * acetaminophen 325 MG tablet   Commonly known as:  TYLENOL   Take 2 tablets (650 mg) by mouth every 4 hours as needed for other (mild pain)   Last time this was given:  975 mg on 12/15/2017  7:22 AM                                * Notice:  This list has 2 medication(s) that are the same as other medications prescribed for you. Read the directions carefully, and ask your doctor or other care provider to review them with you.

## 2017-12-15 NOTE — ANESTHESIA CARE TRANSFER NOTE
Patient: Jesus Landa    Procedure(s):  Insertion of Artificial Urinary Sphincter; Cystoscopy - Wound Class: I-Clean   - Wound Class: II-Clean Contaminated    Diagnosis: Male Stress Urinary Incontinence  Diagnosis Additional Information: No value filed.    Anesthesia Type:   General     Note:  Airway :Face Mask  Patient transferred to:PACU  Comments: Patient awake and breathing spont. VSS. No complaints of pain or nausea. Report to RNHandoff Report: Identifed the Patient, Identified the Reponsible Provider, Reviewed the pertinent medical history, Discussed the surgical course, Reviewed Intra-OP anesthesia mangement and issues during anesthesia, Set expectations for post-procedure period and Allowed opportunity for questions and acknowledgement of understanding      Vitals: (Last set prior to Anesthesia Care Transfer)    CRNA VITALS  12/15/2017 0941 - 12/15/2017 1011      12/15/2017             Resp Rate (set): 10                Electronically Signed By: ALFRED Johansen CRNA  December 15, 2017  10:11 AM

## 2017-12-15 NOTE — OR NURSING
Vital signs stable.  Reviewed discharge instructions.  Meds given to wife.  Patient c/o no pain or nausea.  Unable to void, no urgency.  Drinking fluids.  Will discharge when able to void.

## 2017-12-15 NOTE — BRIEF OP NOTE
[facility]    Brief Operative Note    Pre-operative diagnosis:  Male Stress Urinary Incontinence  Post-operative diagnosis  Male Stress Urinary Incontinence  Procedure: Procedure(s):  Insertion of Artificial Urinary Sphincter; Cystoscopy - Wound Class: I-Clean   - Wound Class: II-Clean Contaminated  Surgeon: Surgeon(s) and Role:     * Rm Mitchell MD - Primary     * Vaibhav Diez MD - Assistant   Anesthesia: General   Estimated blood loss: 10 mL  Drains: None  Specimens: * No specimens in log *  Findings:   Open bladder neck upon cystourethroscopy.  5 cm cuff placed..  Complications: None.  Implants: None.

## 2017-12-15 NOTE — DISCHARGE INSTRUCTIONS
[facility]  Home Care Following Anesthesia  For 24 hours after surgery:  1. Get plenty of rest.  A responsible adult must stay with you for at least 24 hours after you leave the surgery center.  2. Do not drive or use heavy equipment.  If you have weakness or tingling, don't drive or use heavy equipment until this feeling goes away.   3. Do not drink alcohol.   4. Avoid strenuous or risky activities.  Ask for help when climbing stairs.  5. You may feel lightheaded.  IF so, sit for a few minutes before standing.  Have someone help you get up.   6. If you have nausea (feel sick to your stomach): Drink only clear liquids such as apple juice, ginger ale, broth or 7-Up.  Rest may also help.  Be sure to drink enough fluids.  Move to a regular diet as you feel able.   7. You may have a slight fever.  Call the doctor if your fever is over 100 F (37.7 C) (taken under the tongue) or lasts longer than 24 hours.  8. You may have a dry mouth, a sore throat, muscle aches or trouble sleeping. These should go away after 24 hours.  9. Do not make important or legal decisions.               Tips for taking pain medications  To get the best pain relief possible, remember these points:    Take pain medications as directed, before pain becomes severe.    Pain medication can upset your stomach: taking it with food may help.    Constipation is a common side effect of pain medication. Drink plenty of  fluids.    Eat foods high in fiber. Take a stool softener if recommended by your doctor or pharmacist.    Do not drink alcohol, drive or operate machinery while taking pain medications.    Ask about other ways to control pain, such as with heat, ice or relaxation.    Tylenol/Acetaminophen Consumption  To help encourage the safe use of acetaminophen, the makers of TYLENOL  have lowered the maximum daily dose for single-ingredient Extra Strength TYLENOL  (acetaminophen) products sold in the [place] from 8  pills per day (4,000 mg) to 6 pills per day (3,000 mg). The dosing interval has also changed from 2 pills every 4-6 hours to 2 pills every 6 hours.    If you feel your pain relief is insufficient, you may take Tylenol/Acetaminophen in addition to your narcotic pain medication.     Be careful not to exceed 3,000 mg of Tylenol/Acetaminophen in a 24 hour period from all sources.    If you are taking extra strength Tylenol/acetaminophen (500 mg), the maximum dose is 6 tablets in 24 hours.    If you are taking regular strength acetaminophen (325 mg), the maximum dose is 9 tablets in 24 hours.    Call a doctor for any of the followin. Signs of infection (fever, growing tenderness at the surgery site, a large amount of drainage or bleeding, severe pain, foul-smelling drainage, redness, swelling).  2. It has been over 8 to 10 hours since surgery and you are still not able to urinate (pass water).  3. Headache for over 24 hours.  4. Numbness, tingling or weakness the day after surgery (if you had spinal anesthesia).  Your doctor is:  Dr. Rm Sosa, Prostate and Urology: [phone]                    Or dial [phone] and ask for the resident on call for:  Prostate Urology  For emergency care, call the:  [facility]:  [phone] (TTY for hearing impaired: [phone])

## 2017-12-15 NOTE — ANESTHESIA PREPROCEDURE EVALUATION
Anesthesia Evaluation     .             ROS/MED HX    ENT/Pulmonary:  - neg pulmonary ROS     Neurologic:  - neg neurologic ROS     Cardiovascular:     (+) hypertension----. : . . . :. .       METS/Exercise Tolerance:     Hematologic:  - neg hematologic  ROS       Musculoskeletal:  - neg musculoskeletal ROS       GI/Hepatic:  - neg GI/hepatic ROS       Renal/Genitourinary:  - ROS Renal section negative       Endo:  - neg endo ROS       Psychiatric:  - neg psychiatric ROS       Infectious Disease:  - neg infectious disease ROS       Malignancy:      - no malignancy   Other:    - neg other ROS                 Physical Exam  Normal systems: cardiovascular, pulmonary and dental    Airway   Mallampati: I  TM distance: >3 FB  Neck ROM: full    Dental     Cardiovascular   Rhythm and rate: regular and normal      Pulmonary    breath sounds clear to auscultation                    Anesthesia Plan      History & Physical Review  History and physical reviewed and following examination; no interval change.    ASA Status:  2 .    NPO Status:  > 8 hours    Plan for General and LMA with Propofol and Intravenous induction. Maintenance will be TIVA.    PONV prophylaxis:  Ondansetron (or other 5HT-3) and Dexamethasone or Solumedrol       Postoperative Care  Postoperative pain management:  IV analgesics.      Consents  Anesthetic plan, risks, benefits and alternatives discussed with:  Patient..                          .

## 2017-12-15 NOTE — OP NOTE
OPERATIVE REPORT  12/15/2017     PREOPERATIVE DIAGNOSIS:   1. Male stress urinary incontinence.   2. History of prostate cancer status post robotic assisted laparoscopic prostatectomy, and external beam radiation therapy    POSTOPERATIVE DIAGNOSIS:   1. Male stress urinary incontinence.   2. History of prostate cancer status post robotic assisted laparoscopic prostatectomy, and external beam radiation therapy    PROCEDURES PERFORMED:   1. Artificial urinary sphincter placement. 5 cm cuff placed through perineal incision. 61-70 cm H2O pressure regulating balloon placed through subinguinal incision. Scrotal pump placed on patients right side.   2. Cystoscopy.     SURGEON:  Rm Mitchell MD  RESIDENT:  Vaibhav Diez MD  BLOOD LOSS:10 mL.   DRAINS:  None    BRIEF HISTORY OF PRESENT ILLNESS: Jesus Landa is a 57 year old-year-old gentleman with urinary incontinence refractory to minimally invasive management. Etiology of incontinence is status post robotic assisted last laparoscopic prostatectomy for prostate cancer followed by external beam radiation therapy.  He uses 3 large pads during the day and is not dry at night.  He has not had other pelvic surgery aside from his prostate surgery. Risks, benefits and alternatives of AUS placement were discussed including but not limited to bleeding, infection, penile/scrotal pain/numbness, device erosion, urinary retention, persistent urinary incontinence and need for additional procedures.    DESCRIPTION OF PROCEDURE: After informed consent was obtained and pre-operative antibiotics were given in the form of vancomycin and gentamycin, the patient was brought to the operating room. Under general anesthesia he was placed in the low lithotomy position with all pressure points well-padded. The lower abdomen, penis, scrotum and perineum were prepped and draped in the standard sterile fashion. The nursing team began preparing a 61-70 cm H2O PRB.     A 16 Thai flexible  cystoscope was inserted into a well-lubricated urethra.  The urethra was unremarkable and the prostate was surgically absent.  There was no bladder neck contracture.  The cystoscope was then removed.    A vertical midline perineal incision was made and carried down through Colle's fascia. The bulbospongiosus muscles were divided in the midline and dissected off the corpus spongiosum. We circumferentially dissected the bulbar urethra off the underlying corporal bodies and encircled it with the measuring tape. The diameter measured comfortably at 4.5 cm.. A 5 cm cuff was selected and prepared.    A subinguinal incision was made on the right side and the pre-peritoneal space was entered through the external inguinal ring taking care to stay close to the pubic tubercle.  A 12 Tamazight Espinoza catheter was placed at this point to ensure that the bladder was empty during this dissection. The 61-70 cm H20 PRB was placed in this space in the deflated state then inflated with 22 cc NS. The cuff was then passed around the urethra with the tubing exiting on the right side. The curved needle was used to pass the cuff tubing into the subinguinal wound.     A sub dartos pouch was created on the patient's right side using blunt dissection through the subinguinal wound.  Of note, there was a hydrocele on this side as well.  A retaining stitch of 3-0 vicryl was used. Connections were then all done in the standard fashion in the subinguinal wound. The subinguinal wound was closed in 2 layers with 3-0 Vicryl on Jovan's fascia and a 4-0 Monocryl subcuticular stitch.  Benzoin, Steri-Strips, and a dressing were applied.  The perineal wound was closed in 3 layers with 3-0 Vicryl on Colle's fascia and the muscle layer as well as 4-0 Vicryl in a horizontal mattress fashion on the skin.  Bacitracin, fluffs and a scrotal support were applied. The cuff was left deactivated.    Height: 1.829 m  Weight: 97.1 kg  BMI: 29.02  Operative time: 45  min

## 2017-12-15 NOTE — IP AVS SNAPSHOT
[facility]    [address] [place] [address]    Phone:  [phone]    Fax:  [phone]                                       After Visit Summary   12/15/2017    Jesus Landa    MRN: [id number]           After Visit Summary Signature Page     I have received my discharge instructions, and my questions have been answered. I have discussed any challenges I see with this plan with the nurse or doctor.    ..........................................................................................................................................  Patient/Patient Representative Signature      ..........................................................................................................................................  Patient Representative Print Name and Relationship to Patient    ..................................................               ................................................  Date                                            Time    ..........................................................................................................................................  Reviewed by Signature/Title    ...................................................              ..............................................  Date                                                            Time

## 2017-12-25 ENCOUNTER — TELEPHONE (OUTPATIENT)
Dept: UROLOGY | Facility: CLINIC | Age: 57
End: 2017-12-25

## 2017-12-26 ENCOUNTER — HOSPITAL ENCOUNTER (EMERGENCY)
Facility: CLINIC | Age: 57
Discharge: HOME | End: 2017-12-26
Attending: FAMILY MEDICINE | Admitting: FAMILY MEDICINE
Payer: COMMERCIAL

## 2017-12-26 VITALS
DIASTOLIC BLOOD PRESSURE: 78 MMHG | RESPIRATION RATE: 16 BRPM | TEMPERATURE: 97.5 F | BODY MASS INDEX: 29.2 KG/M2 | SYSTOLIC BLOOD PRESSURE: 142 MMHG | HEART RATE: 78 BPM | WEIGHT: 215.3 LBS | OXYGEN SATURATION: 100 %

## 2017-12-26 DIAGNOSIS — R33.9 URINARY RETENTION: ICD-10-CM

## 2017-12-26 PROCEDURE — 99282 EMERGENCY DEPT VISIT SF MDM: CPT | Mod: Z6 | Performed by: FAMILY MEDICINE

## 2017-12-26 PROCEDURE — 99282 EMERGENCY DEPT VISIT SF MDM: CPT | Performed by: FAMILY MEDICINE

## 2017-12-26 ASSESSMENT — ENCOUNTER SYMPTOMS
ACTIVITY CHANGE: 0
CONFUSION: 0
APPETITE CHANGE: 0
EYE REDNESS: 0
DIFFICULTY URINATING: 1
COLOR CHANGE: 0
ARTHRALGIAS: 0
ABDOMINAL PAIN: 0
NECK STIFFNESS: 0
HEADACHES: 0
FEVER: 0
BACK PAIN: 0
SHORTNESS OF BREATH: 0

## 2017-12-26 NOTE — DISCHARGE INSTRUCTIONS
Home.  Monitor symptoms  Follow up with Dr. Sosa as planned.  REturn or call if any concerns, fever, etc.

## 2017-12-26 NOTE — ED AVS SNAPSHOT
[facility], [place], Emergency Department    [address] [place] [address]    Phone:  [phone]                                       Jesus Landa   MRN: [id number]    Department:  [facility], Emergency Department   Date of Visit:  12/26/2017           After Visit Summary Signature Page     I have received my discharge instructions, and my questions have been answered. I have discussed any challenges I see with this plan with the nurse or doctor.    ..........................................................................................................................................  Patient/Patient Representative Signature      ..........................................................................................................................................  Patient Representative Print Name and Relationship to Patient    ..................................................               ................................................  Date                                            Time    ..........................................................................................................................................  Reviewed by Signature/Title    ...................................................              ..............................................  Date                                                            Time

## 2017-12-26 NOTE — ED PROVIDER NOTES
History     Chief Complaint   Patient presents with     Urinary Retention     HPI  Jesus Landa is a 57 year old male who presents emergency room with urinary retention for the last 2-3 days.  Patient history of prostate cancer had a prostatectomy surgery along with this a urinary sphincter implant was placed on 15 December.  Patient stated worked well initially [place] having some problems over the last few days still able to empty no fevers or chills otherwise noted.  She notes stream is very slow.  No back pain or other complaints noted.    I have reviewed the Medications, Allergies, Past Medical and Surgical History, and Social History in the Epic system.    Review of Systems   Constitutional: Negative for activity change, appetite change and fever.   HENT: Negative for congestion.    Eyes: Negative for redness.   Respiratory: Negative for shortness of breath.    Cardiovascular: Negative for chest pain.   Gastrointestinal: Negative for abdominal pain.   Genitourinary: Positive for difficulty urinating.        Patient with some decrease in the urinary stream over the last few days.   Musculoskeletal: Negative for arthralgias, back pain and neck stiffness.   Skin: Negative for color change.   Neurological: Negative for headaches.   Psychiatric/Behavioral: Negative for confusion.   All other systems reviewed and are negative.      Physical Exam   BP: 167/84  Pulse: 78  Heart Rate: 83  Temp: 97.5  F (36.4  C)  Resp: 16  Weight: 97.7 kg (215 lb 4.8 oz)  SpO2: 100 %      Physical Exam   Constitutional: He is oriented to person, place, and time. He appears well-developed and well-nourished. No distress.   Patient pleasant here with his wife no significant distress is nontoxic   HENT:   Head: Normocephalic and atraumatic.   Eyes: EOM are normal. Pupils are equal, round, and reactive to light. No scleral icterus.   Neck: Normal range of motion. Neck supple.   Cardiovascular: Regular rhythm.    Pulmonary/Chest: No  respiratory distress.   Abdominal: There is no guarding.   No complaints of retention   Musculoskeletal: He exhibits no tenderness.   Neurological: He is alert and oriented to person, place, and time. He has normal reflexes.   Skin: Skin is warm and dry. No rash noted. He is not diaphoretic. No erythema. No pallor.   Psychiatric: He has a normal mood and affect. His behavior is normal. Judgment and thought content normal.   Nursing note and vitals reviewed.      ED Course     ED Course     Procedures           Patient evaluated the ER by urology.  They reset his device.  It appears now to be working well.  Postvoid residual was 26 cc.  Patient feels better notes his stream is improved.  Patient will follow up with Dr. Mitchell as an outpatient calling urology if any problems otherwise comfortable going home continue current plan.  Urology at this point felt without fever etc. no indication to send off urine for culture etc.       Critical Care time:  none             Labs Ordered and Resulted from Time of ED Arrival Up to the Time of Departure from the ED - No data to display    Consults  Urology: Responded (Dr. Lee with [facility], will call back in 10-15) (12/26/17 0750)    Assessments & Plan (with Medical Decision Making)  57-year-old male recent prostate surgery with urinary sphincter implant placed.  Patient now with some decreased urine flow but no significant retention symptoms at this point.  No sign of fever etc.  Seen by urology they reset the device now works fine.  Patient comfortable going home will follow up with urology's plan return for fevers or other concerns.           I have reviewed the nursing notes.    I have reviewed the findings, diagnosis, plan and need for follow up with the patient.    Discharge Medication List as of 12/26/2017  9:20 AM          Final diagnoses:   Urinary retention       12/26/2017   [facility], EMERGENCY DEPARTMENT    This note was created at least in part by the use of  dragon voice dictation system. Inadvertent typographical errors may still exist.  Petey Lee MD.         Petey Lee MD  12/26/17 6775

## 2017-12-26 NOTE — CONSULTS
Urology Consult    Name:  Jesus Landa  MRN:  [id number]  Age/: 57 year old, 1960    CC: Urinary retention following AUS    HPI: Jesus Landa is a(n) 57 year old male with a past medical history notable for prostate cancer status post robotic assisted laparoscopic prostatectomy as well as external beam radiation with resultant male stress urinary incontinence who underwent artificial urinary sphincter placement on December 15, 2017.  The case was uncomplicated; the patient discharged home on postoperative day 0 from the ambulatory surgery center.  The patient has had a relatively unremarkable postoperative course, however over the past 2-4 days he has noted increasing difficulty with voiding.  He contacted the urology resident on call overnight who advised the patient to seek care at the emergency department.  He is had no fevers or chills, and denies any significant pain.  He denies dysuria.  He notes he has not experienced any trauma to the scrotum or perineum, and has not attempted to use the device himself.    Past Medical History:  Past Medical History:   Diagnosis Date     Colon polyps      Diverticulosis      Hyperlipidemia      Hypertension      Left cornea abrasion      Prostate cancer (H)      Tympanic membrane perforation        Past Surgical History:  Past Surgical History:   Procedure Laterality Date     amputation of index finger[      with neurectomy each right finger tip     C ORAL SURGERY PROCEDURE       CHOLECYSTECTOMY       COLONOSCOPY       CYSTOSCOPY N/A 12/15/2017    Procedure: CYSTOSCOPY;;  Surgeon: Rm Mitchell MD;  Location: [facility] OR     DAVINCI PROSTATECTOMY  2013    Procedure: DAVINCI PROSTATECTOMY;  Davinci Radical  Prostatectomy, Bilateral Pelvic Lymhadenectomy  ;  Surgeon: Idania Sparks MD;  Location: [facility] OR     HC COLONOSCOPY THRU STOMA W BIOPSY/CAUTERY TUMOR/POLYP/LESION       IMPLANT PROSTHESIS SPHINCTER URINARY N/A 12/15/2017    Procedure: IMPLANT PROSTHESIS  SPHINCTER URINARY;  Insertion of Artificial Urinary Sphincter; Cystoscopy;  Surgeon: Rm Mitchell MD;  Location: [facility] OR       Allergies:   No Known Allergies    Medications:    No current facility-administered medications on file prior to encounter.   Current Outpatient Prescriptions on File Prior to Encounter:  acetaminophen (TYLENOL) 325 MG tablet Take 2 tablets (650 mg) by mouth every 4 hours as needed for other (mild pain)   oxyCODONE IR (ROXICODONE) 5 MG tablet Take 1-2 tablets (5-10 mg) by mouth every 6 hours as needed for other (Moderate to Severe Pain)   senna-docusate (SENOKOT-S;PERICOLACE) 8.6-50 MG per tablet Take 1-2 tablets by mouth 2 times daily To be taken with opioid (narcotic) pain medication to prevent constipation.   Multiple Vitamins-Minerals (CENTRUM SILVER ULTRA MENS PO)    ibuprofen (ADVIL/MOTRIN) 400 MG tablet    losartan (COZAAR) 50 MG tablet    SUMAtriptan (IMITREX) 50 MG tablet    Acetaminophen (TYLENOL) 325 MG CAPS    aspirin 81 MG tablet Take  by mouth daily.       Social History:  Social History     Social History     Marital status:      Spouse name: N/A     Number of children: N/A     Years of education: N/A     Occupational History     Not on file.     Social History Main Topics     Smoking status: Never Smoker     Smokeless tobacco: Never Used     Alcohol use No     Drug use: No     Sexual activity: Not on file     Other Topics Concern     Not on file     Social History Narrative       Family History:  No family history on file.    ROS:  The remainder of the complete ROS was negative unless noted in the HPI.    Exam:  /84  Temp 97.5  F (36.4  C) (Oral)  Resp 16  Wt 97.7 kg (215 lb 4.8 oz)  SpO2 100%  BMI 29.2 kg/m2  General: Alert, interactive, & in NAD  Resp: Breathing is non-labored on room air   Cardiac: Extremities warm  Abdomen: Soft, non-tender, nondistended. Left sub-inguinal incision is well healed.    : Circumcised phallus with orthotopic  meatus.  Perineal incision is healing well and is without erythema, fluctuance, or discharge.  Ecchymosis present in left hemiscrotum.  Activator switch located in right scrotum.  The orientation of this which appears to be upside down with the tubing entering inferiorly.  The deactivating button is on the posterior aspect of the switch currently.  No dimple is palpable upon initial exam.  Artificial urinary sphincter cycled; there was a large wound of fluid consistent with a deactivated device, however no dimple was present.  The activation switch was pumped several times to ensure all fluid was and the pressure regulating balloon.  Once the activator switch started feeling again with fluid it was locked out.  This was checked 10 minutes later and a dimple continued to be present.  Extremities: No LE edema or obvious joint abnormalities  Skin: Warm and dry, no jaundice or rash    Labs:  All laboratory data reviewed    Assessment and Plan: Jesus Landa is a(n) 57 year old male with a history of prostate cancer status post robotic assisted laparoscopic prostatectomy as well as radiation therapy with resultant male stress urinary incontinence.  He is status post artificial urinary sphincter placement December 15, 2017.  He initially had an uncomplicated postoperative course, but presents this morning with complaints of difficulty with urination over the past 2-4 days.  Upon exam, though his device remained in the locked position, there appeared to be no dimple present.  After cycling the device and locking out with a more significant dimple in the activator button, the patient notes that his urine stream is much stronger and easier to pass.  He was bladder scanned after this for 26 mL.  At this time, no significant concern for further retention.    1.  Disposition per emergency department  2.  Okay for discharge from a urology standpoint  3.  Patient to follow-up with urology service as scheduled in early January.  4.   Strict return precautions given to the patient, including but not limited to: Fever greater than 101.4, erythema of the incision, fluctuance, purulent drainage, or any concern for infection, and inability to void.    This patient's exam findings, labs, and imaging discussed with urology staff surgeon Dr. Mitchell, who developed the treatment plan.

## 2017-12-26 NOTE — ED NOTES
Jesus presents with c/o urinary retention and difficulty voiding for past 2-4 days. History of prostate cancer and urinary sphincter implant placed on 12/15. He notified urology overnight who advised him to come in. Denies fevers, chills or pain. Denies hematuria. States he is able to empty his bladder but the stream is weak and it takes a long time.

## 2017-12-26 NOTE — ED AVS SNAPSHOT
[facility], Emergency Department    [address] [address]    Phone:  [phone]                                       Jesus Landa   MRN: [id number]    Department:  [facility], Emergency Department   Date of Visit:  12/26/2017           Patient Information     Date Of Birth          1960        Your diagnoses for this visit were:     Urinary retention        You were seen by Petey Lee MD.      Follow-up Information     Follow up with Rm Mitchell MD.    Specialty:  Urology    Contact information:    [address]  [facility] [id number] [phone]          Discharge Instructions       Home.  Monitor symptoms  Follow up with Dr. Sosa as planned.  REturn or call if any concerns, fever, etc.      Future Appointments        Provider Department Dept Phone Center    1/8/2018 4:00 PM Rm Mitchell MD [facility] [phone] [facility]      24 Hour Appointment Hotline       To make an appointment at any [facility] clinic, call [phone] ([phone]). If you don't have a family doctor or clinic, we will help you find one. [facility] clinics are conveniently located to serve the needs of you and your family.             Review of your medicines      Our records show that you are taking the medicines listed below. If these are incorrect, please call your family doctor or clinic.        Dose / Directions Last dose taken    aspirin 81 MG tablet        Take  by mouth daily.   Refills:  0        CENTRUM SILVER ULTRA MENS PO        Refills:  0        ibuprofen 400 MG tablet   Commonly known as:  ADVIL/MOTRIN        Refills:  0        losartan 50 MG tablet   Commonly known as:  COZAAR        Refills:  0        oxyCODONE IR 5 MG tablet   Commonly known as:  ROXICODONE   Dose:  5-10 mg   Quantity:  30 tablet        Take 1-2 tablets (5-10 mg) by mouth every 6 hours as needed for other (Moderate to Severe Pain)    Refills:  0        senna-docusate 8.6-50 MG per tablet   Commonly known as:  SENOKOT-S;PERICOLACE   Dose:  1-2 tablet   Quantity:  100 tablet        Take 1-2 tablets by mouth 2 times daily To be taken with opioid (narcotic) pain medication to prevent constipation.   Refills:  0        SUMAtriptan 50 MG tablet   Commonly known as:  IMITREX        Refills:  0        * TYLENOL 325 MG Caps   Generic drug:  Acetaminophen        Refills:  0        * acetaminophen 325 MG tablet   Commonly known as:  TYLENOL   Dose:  650 mg   Quantity:  100 tablet        Take 2 tablets (650 mg) by mouth every 4 hours as needed for other (mild pain)   Refills:  0        * Notice:  This list has 2 medication(s) that are the same as other medications prescribed for you. Read the directions carefully, and ask your doctor or other care provider to review them with you.            Procedures and tests performed during your visit     Bladder scan      Orders Needing Specimen Collection     None      Pending Results     No orders found from 12/24/2017 to 12/27/2017.            Pending Culture Results     No orders found from 12/24/2017 to 12/27/2017.            Pending Results Instructions     If you had any lab results that were not finalized at the time of your Discharge, you can call the ED Lab Result RN at [phone]. You will be contacted by this team for any positive Lab results or changes in treatment. The nurses are available 7 days a week from 10A to 6:30P.  You can leave a message 24 hours per day and they will return your call.        Thank you for choosing [facility]       Thank you for choosing [facility] for your care. Our goal is always to provide you with excellent care. Hearing back from our patients is one way we can continue to improve our services. Please take a few minutes to complete the written survey that you may receive in the mail after you visit with us. Thank you!        [employer] Information     [employer] gives you secure  access to your electronic health record. If you see a primary care provider, you can also send messages to your care team and make appointments. If you have questions, please call your primary care clinic.  If you do not have a primary care provider, please call [phone] and they will assist you.        Care EveryWhere ID     This is your Care EveryWhere ID. This could be used by other organizations to access your [facility] medical records  [id number]        Equal Access to Services     MAANDA CHO : Abhinav Noble, waismaelda luekaterina, qaybta kaalmakatlyn johnson, didier muñoz. So [facility] [phone].    ATENCIÓN: Si habla yeni, tiene a plata disposición servicios gratuitos de asistencia lingüística. Llame al [phone].    We comply with applicable federal civil rights laws and [place] laws. We do not discriminate on the basis of race, color, national origin, age, disability, sex, sexual orientation, or gender identity.            After Visit Summary       This is your record. Keep this with you and show to your community pharmacist(s) and doctor(s) at your next visit.

## 2017-12-26 NOTE — TELEPHONE ENCOUNTER
"Urology Telephone Note    Reason for Call: difficulty with urination s/p recent AUS placement    Jesus Landa is a 57 year old male with history of male HIMANSHU following RALP and EBRT, s/p recent AUS placement (12/15) with Dr. Sosa. Procedure was uncomplicated, and patient was discharged the same day with the device deactivated.      He now calls to report progressive difficulty with urination over the past 3 days-weakening of stream, down to a slow dribble, but is able to void completely.  He denies manipulating the device or accidentally activating it.  He has scheduled follow-up appointment with Dr. Mitchell on January 8, 2017 for device activation.  He denies fevers, chills, dysuria, hematuria, suprapubic tenderness or sense of incomplete voiding.    Patient attempted to assist in the device during our call using the provided  instructions to see if the device is still deactivated, i.e., whether there is a palpable \"dimple\" on the lower part of the device.  He says that he is unable to feel the dimple, and in the lower part of the device feels flat.    Review of systems:  As above    Plan:     Given the high likelihood that the patient's device has been inadvertently activated, and given his difficulty with urination, I recommended that the patient presented to our emergency department for further evaluation and, if necessary, deactivation of the device until his scheduled follow-up appointment with Dr. Mitchell.  However, the patient and wife stated that the above 3 hours away, and driving down to the hospital  would be quite challenging.  Since the patient remains able to void, albeit slowly, he thus prefers to present to the emergency department early tomorrow morning.  I emphasized to the patient the need to present to the emergency department earlier than this if he is no longer able to void at all, and/or if he starts to develop progressive urinary retention and suprapubic tenderness. Patient and wife " stated understanding of this recommendation.     Advised patient that given the limitations of telephone conversation, mainly the inability to of a physical examination, only recommendations can be given.    Also counseled that in the case of an emergency or lack of transport, patient counseled to call 911 for an ambulance.    Will route message to staff physician, Dr. Ian Mcgee MD MS  Urology Resident

## 2018-01-02 ENCOUNTER — PRE VISIT (OUTPATIENT)
Dept: UROLOGY | Facility: CLINIC | Age: 58
End: 2018-01-02

## 2018-01-02 NOTE — TELEPHONE ENCOUNTER
Pt coming in for a post op appointment - AUS placement.  All records available. No need for a call.

## 2018-01-08 ENCOUNTER — OFFICE VISIT (OUTPATIENT)
Dept: UROLOGY | Facility: CLINIC | Age: 58
End: 2018-01-08
Payer: COMMERCIAL

## 2018-01-08 VITALS
WEIGHT: 216 LBS | HEIGHT: 72 IN | DIASTOLIC BLOOD PRESSURE: 86 MMHG | BODY MASS INDEX: 29.26 KG/M2 | SYSTOLIC BLOOD PRESSURE: 152 MMHG | HEART RATE: 68 BPM

## 2018-01-08 DIAGNOSIS — N39.3 MALE URINARY STRESS INCONTINENCE: Primary | ICD-10-CM

## 2018-01-08 ASSESSMENT — PAIN SCALES - GENERAL: PAINLEVEL: NO PAIN (0)

## 2018-01-08 NOTE — MR AVS SNAPSHOT
After Visit Summary   1/8/2018    Jesus Landa    MRN: [id number]           Patient Information     Date Of Birth          1960        Visit Information        Provider Department      1/8/2018 4:00 PM mR Mitchell MD [facility] Urology and [facility] Urologic [facility]        Today's Diagnoses     Male urinary stress incontinence    -  1       Follow-ups after your visit        Who to contact     Please call your clinic at [phone] to:    Ask questions about your health    Make or cancel appointments    Discuss your medicines    Learn about your test results    Speak to your doctor   If you have compliments or concerns about an experience at your clinic, or if you wish to file a complaint, please contact [facility] Patient Relations at [phone] or email us at [email or web address]         Additional Information About Your Visit        MyChart Information     [employer] gives you secure access to your electronic health record. If you see a primary care provider, you can also send messages to your care team and make appointments. If you have questions, please call your primary care clinic.  If you do not have a primary care provider, please call [phone] and they will assist you.      [employer] is an electronic gateway that provides easy, online access to your medical records. With [employer], you can request a clinic appointment, read your test results, renew a prescription or communicate with your care team.     To access your existing account, please contact your [facility] or call [phone] for assistance.        Care EveryWhere ID     This is your Care EveryWhere ID. This could be used by other organizations to access your [facility] medical records  [id number]        Your Vitals Were     Pulse Height BMI (Body Mass Index)             68 1.829 m (6') 29.29 kg/m2          Blood Pressure from Last 3  Encounters:   01/08/18 152/86   12/26/17 142/78   12/15/17 140/88    Weight from Last 3 Encounters:   01/08/18 98 kg (216 lb)   12/26/17 97.7 kg (215 lb 4.8 oz)   10/23/17 97.1 kg (214 lb)              Today, you had the following     No orders found for display       Primary Care Provider Office Phone # Fax #    [facility] [phone]       [address] [place] [id number]        Equal Access to Services     [facility]: Hadii chano de la cruz hadasho Sochristine, waaxda luqadaha, qaybta kaalmada adeegyakatlyn, didier lopez . So [facility] [phone].    ATENCIÓN: Si habla español, tiene a plata disposición servicios gratuitos de asistencia lingüística. Llame al [phone].    We comply with applicable federal civil rights laws and [place] laws. We do not discriminate on the basis of race, color, national origin, age, disability, sex, sexual orientation, or gender identity.            Thank you!     Thank you for choosing [facility] UROLOGY AND [facility] FOR PROSTATE AND UROLOGIC CANCERS  for your care. Our goal is always to provide you with excellent care. Hearing back from our patients is one way we can continue to improve our services. Please take a few minutes to complete the written survey that you may receive in the mail after your visit with us. Thank you!             Your Updated Medication List - Protect others around you: Learn how to safely use, store and throw away your medicines at www.disposemymeds.org.          This list is accurate as of: 1/8/18  5:04 PM.  Always use your most recent med list.                   Brand Name Dispense Instructions for use Diagnosis    acetaminophen 325 MG tablet    TYLENOL    100 tablet    Take 2 tablets (650 mg) by mouth every 4 hours as needed for other (mild pain)    Stress incontinence of urine       aspirin 81 MG tablet      Take  by mouth daily.        CENTRUM SILVER ULTRA MENS PO           ibuprofen 400 MG tablet     ADVIL/MOTRIN          losartan 50 MG tablet    COZAAR          SUMAtriptan 50 MG tablet    IMITREX

## 2018-01-08 NOTE — PROGRESS NOTES
Urology Clinic Note    Date: 1/8/2018  Patient: Jesus Landa  MRN: [id number]    HPI/Subjective: Jesus Landa is a 57 year old male who is s/p artificial urinary sphincter done 12/15/2017 by Dr. Mitchell who returns today for follow up.  He was seen in the ED with c/o a slowed urine stream on 12/26/2017.  At that time, his device was noted to be deactivated, however the dimple on the activator switch was minimally present.  The device was cycled and locked out in a position with a greater dimple.    He states that he was doing well for 2 days afterwards then had symptoms of retention again.    Therefore, he studied the manual for the AUS and taught himself how to cycle the device appropriately, and he has been doing that since. He is squeezing the pump appropriately each time he needs to void. He is able to urinate to completion. Then the device automatically refills the cuff, as it is designed to.     He previously wore > 3 pads/day. And now he has only occasional drops. He is very happy with his current level of continence.    Objective:  Ht 1.829 m (6')  NAD  CTAB  RRR  Abd soft  Incisions c/d/i  Pump somewhat end-on orientation in the scrotum though easily palpable. No signs of infection.     Assessment & Plan: Jesus Landa is a 57 year old male doing well s/p artificial urinary sphincter placement. He is doing well from a surgical perspective. He will continue to use his AUS as intended. He will followup as needed in our clinic.

## 2018-01-08 NOTE — NURSING NOTE
Chief Complaint   Patient presents with     Surgical Followup     Pt coming in for a post op appointment - AUS placement       Blood pressure 152/86, pulse 68, height 1.829 m (6'), weight 98 kg (216 lb). Body mass index is 29.29 kg/(m^2).    Patient Active Problem List   Diagnosis     Malignant neoplasm of prostate (H)     Prostate cancer (H)       No Known Allergies    Current Outpatient Prescriptions   Medication Sig Dispense Refill     acetaminophen (TYLENOL) 325 MG tablet Take 2 tablets (650 mg) by mouth every 4 hours as needed for other (mild pain) 100 tablet 0     Multiple Vitamins-Minerals (CENTRUM SILVER ULTRA MENS PO)        ibuprofen (ADVIL/MOTRIN) 400 MG tablet        losartan (COZAAR) 50 MG tablet        SUMAtriptan (IMITREX) 50 MG tablet        aspirin 81 MG tablet Take  by mouth daily.         Social History   Substance Use Topics     Smoking status: Never Smoker     Smokeless tobacco: Never Used     Alcohol use No       FAUZIA Gabriel  1/8/2018  4:05 PM

## 2018-01-08 NOTE — LETTER
1/8/2018       RE: Jesus Landa  [address] [address] [address]     Dear Colleague,    Thank you for referring your patient, Jesus Landa, to the [facility] UROLOGY AND [facility]ST FOR PROSTATE AND UROLOGIC CANCERS at [facility]. Please see a copy of my visit note below.    Urology Clinic Note    Date: 1/8/2018  Patient: Jesus Landa  MRN: [id number]    HPI/Subjective: Jesus Landa is a 57 year old male who is s/p artificial urinary sphincter done 12/15/2017 by Dr. Mitchell who returns today for follow up.  He was seen in the ED with c/o a slowed urine stream on 12/26/2017.  At that time, his device was noted to be deactivated, however the dimple on the activator switch was minimally present.  The device was cycled and locked out in a position with a greater dimple.    He states that he was doing well for 2 days afterwards then had symptoms of retention again.    Therefore, he studied the manual for the AUS and taught himself how to cycle the device appropriately, and he has been doing that since. He is squeezing the pump appropriately each time he needs to void. He is able to urinate to completion. Then the device automatically refills the cuff, as it is designed to.     He previously wore > 3 pads/day. And now he has only occasional drops. He is very happy with his current level of continence.    Objective:  Ht 1.829 m (6')  NAD  CTAB  RRR  Abd soft  Incisions c/d/i  Pump somewhat end-on orientation in the scrotum though easily palpable. No signs of infection.     Assessment & Plan: Jesus Landa is a 57 year old male doing well s/p artificial urinary sphincter placement. He is doing well from a surgical perspective. He will continue to use his AUS as intended. He will followup as needed in our clinic.        Again, thank you for allowing me to participate in the care of your patient.      Sincerely,    Rm Mitchell MD

## 2018-01-08 NOTE — LETTER
January 8, 2018    RE:  Jesus MCDERMOTT Landa                              [address] [address] [address]            To whom it may concern:    Jesus Landa is under my professional. He may return to work with the following: The employee is ABLE to return to work today with no restrictions.      Sincerely,        Rm Mitchell MD

## 2018-08-01 ENCOUNTER — TRANSFERRED RECORDS (OUTPATIENT)
Dept: HEALTH INFORMATION MANAGEMENT | Facility: CLINIC | Age: 58
End: 2018-08-01

## 2019-08-12 ENCOUNTER — TELEPHONE (OUTPATIENT)
Dept: UROLOGY | Facility: CLINIC | Age: 59
End: 2019-08-12

## 2019-08-12 NOTE — TELEPHONE ENCOUNTER
Fahad Tavares,     Is he having urinary incontinence since his AUS isn't working?  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  He can try a cunningham clamp but he would need to come here for nurse visit to get sized.  He should really just make an appointment with Dr. Allan for follow up     Thanks,   Raquel

## 2019-08-12 NOTE — TELEPHONE ENCOUNTER
Fahad García,    I know that the patient needs to schedule a follow up visit. Do you know what other options this patient can do in the meantime. Please advise.    Thanks, Chance Paul MA

## 2019-08-12 NOTE — TELEPHONE ENCOUNTER
Health Call Center    Phone Message    May a detailed message be left on voicemail: yes    Reason for Call: Other: Jesus calling to report that his sphincter valve stopped working completely about two weeks ago.  He is wondering what options he would have and what he should do.  Please call him back to discuss     Action Taken: Message routed to:  [facility]  Urology

## 2019-08-12 NOTE — TELEPHONE ENCOUNTER
Fahad García,     Patient states that his AUS leaks all the time. He states that he urinates without releasing. Patient states that the only time he can come in to see Dr. Xiao Allan is in September 2019. Patient scheduled with Dr. Xiao Allan on 9/9/2019.      Chance Paul MA

## 2019-08-26 ENCOUNTER — PRE VISIT (OUTPATIENT)
Dept: UROLOGY | Facility: CLINIC | Age: 59
End: 2019-08-26

## 2019-08-26 NOTE — TELEPHONE ENCOUNTER
Reason for Visit: AUS follow up, leakage per pt    Diagnosis: Urinary stress incontinence    Orders/Procedures/Records: Records available    Contact Patient: N/A    Rooming Requirements: Oswaldo Jacobson  08/26/19  3:37 PM

## 2019-09-08 NOTE — PROGRESS NOTES
Follow up for urinary incontinence after AUS    HPI:  Jesus Landa is a 57 year old male who is s/p artificial urinary sphincter placement 12/15/2017 by Dr. Mitchell.   Last seen Jan 2018, very pleased and dry at that time.    Today he comes for leakage that is back to his baseline prior to AUS placement.  Started 1 month ago.   States he does not need to cycle his AUS at all to void.   Feels he empties his bladder well.   No UTIs. No swelling or pain or fevers.   Leakage depends on how much he drinks, up to 3 pads per day.  No hematuria.       Relevant history:   RALP in 2/2013 (pT2cN0)  Salvage EBRT 1.5 years later  5cm AUS cuff placed around bulbar urethra      Current Outpatient Medications   Medication Sig Dispense Refill     acetaminophen (TYLENOL) 325 MG tablet Take 2 tablets (650 mg) by mouth every 4 hours as needed for other (mild pain) 100 tablet 0     aspirin 81 MG tablet Take  by mouth daily.       ibuprofen (ADVIL/MOTRIN) 400 MG tablet        losartan (COZAAR) 50 MG tablet        Multiple Vitamins-Minerals (CENTRUM SILVER ULTRA MENS PO)        SUMAtriptan (IMITREX) 50 MG tablet          No Known Allergies  Past Medical History:   Diagnosis Date     Colon polyps      Diverticulosis      Hyperlipidemia      Hypertension      Left cornea abrasion      Prostate cancer (H)      Tympanic membrane perforation      Past Surgical History:   Procedure Laterality Date     amputation of index finger[      with neurectomy each right finger tip     C ORAL SURGERY PROCEDURE       CHOLECYSTECTOMY       COLONOSCOPY       CYSTOSCOPY N/A 12/15/2017    Procedure: CYSTOSCOPY;;  Surgeon: Rm Mitchell MD;  Location: [facility] OR     DAVINCI PROSTATECTOMY  2/1/2013    Procedure: DAVINCI PROSTATECTOMY;  Davinci Radical  Prostatectomy, Bilateral Pelvic Lymhadenectomy  ;  Surgeon: Idania Sparks MD;  Location: [facility] OR      COLONOSCOPY THRU STOMA W BIOPSY/CAUTERY TUMOR/POLYP/LESION       IMPLANT PROSTHESIS SPHINCTER  URINARY N/A 12/15/2017    Procedure: IMPLANT PROSTHESIS SPHINCTER URINARY;  Insertion of Artificial Urinary Sphincter; Cystoscopy;  Surgeon: Rm Mitchell MD;  Location: [facility] OR         [facility] -see hpi otherwise rest is negative   OBJECTIVE:  Vitals:    09/09/19 1300   BP: (!) 153/81   Pulse: 65   Weight: 97.5 kg (215 lb)   Height: 1.829 m (6')     EXAM:  Constitutional: healthy, alert and no distress   Cardiovascular: regular rate, normal perfusion  Respiratory: normal work of breathing  Psychiatric: mentation appears normal and affect normal/bright  Head: Normocephalic. EOMI. Moist membranes  Abdomen: Abdomen soft, non-tender.   : circ male, adequate meatus, glans without lesion, scrotum is symmetric, pump palpable in scrotum without edema or pain, cycles easily, cuff palpable in perineum without tenderness  NEURO: Gait normal. Grossly non-focal  SKIN: Soft, dry  JOINT/EXTREMITIES: extremities normal - no gross deformities noted and gait normal      PROCEDURE:  Jesus Landa was consented and placed in the lithotomy position. Pre-procedural antibiotics were not given. His genitalia were cleaned and prepared in the usual fashion.  Lidocaine gel was inserted into the urethra and given time to take effect.  A 16 Fr flexible cystoscope was then inserted through the urethra. The urethra was normal to the level of the artificial sphincter. The sphincter was incompletely coapted and could probably accommodate the scope. The cuff was cycled and opened slightly but not much. The scope passed the artifical sphincter in the mid-bulbar urethra and just proximal to the this there was a 15Fr very short and soft appearing urethral stricture. I could see the external sphincter past this. Did not pass this with a scope since he is asymptomatic from this. The cystoscope was then withdrawn.  He tolerated the procedure well.      Assessment/Plan: Jesus Landa is a 59 year old male with recurrent urinary incontinence after AUS.  PVR 20cc. Cysto today shows poorly coapted urethra at level of cuff. Also an asymptomatic proximal bulbar stricture ~15 Fr.     -- Differential is urethral atrophy vs leak of fluid from system  -- Will discuss patient with Dr. Mitchell and contact patient regarding treatment options.       Mitra Allan MD  Reconstructive Urology Fellow      Answers for HPI/ROS submitted by the patient on 9/9/2019   PHQ9 TOTAL SCORE: 1

## 2019-09-09 ENCOUNTER — ANCILLARY PROCEDURE (OUTPATIENT)
Dept: GENERAL RADIOLOGY | Facility: CLINIC | Age: 59
End: 2019-09-09
Payer: COMMERCIAL

## 2019-09-09 ENCOUNTER — OFFICE VISIT (OUTPATIENT)
Dept: UROLOGY | Facility: CLINIC | Age: 59
End: 2019-09-09
Payer: COMMERCIAL

## 2019-09-09 VITALS
WEIGHT: 215 LBS | SYSTOLIC BLOOD PRESSURE: 153 MMHG | BODY MASS INDEX: 29.12 KG/M2 | HEIGHT: 72 IN | DIASTOLIC BLOOD PRESSURE: 81 MMHG | HEART RATE: 65 BPM

## 2019-09-09 DIAGNOSIS — N39.3 MALE URINARY STRESS INCONTINENCE: Primary | ICD-10-CM

## 2019-09-09 DIAGNOSIS — Z92.3 HISTORY OF RADIATION EXPOSURE: ICD-10-CM

## 2019-09-09 DIAGNOSIS — N39.3 MALE URINARY STRESS INCONTINENCE: ICD-10-CM

## 2019-09-09 DIAGNOSIS — Z85.46 PERSONAL HISTORY OF MALIGNANT NEOPLASM OF PROSTATE: ICD-10-CM

## 2019-09-09 PROBLEM — Z86.0100 HISTORY OF COLON POLYPS: Status: ACTIVE | Noted: 2019-09-09

## 2019-09-09 ASSESSMENT — MIFFLIN-ST. JEOR: SCORE: 1828.23

## 2019-09-09 ASSESSMENT — PATIENT HEALTH QUESTIONNAIRE - PHQ9
SUM OF ALL RESPONSES TO PHQ QUESTIONS 1-9: 1
SUM OF ALL RESPONSES TO PHQ QUESTIONS 1-9: 1

## 2019-09-09 ASSESSMENT — PAIN SCALES - GENERAL: PAINLEVEL: NO PAIN (0)

## 2019-09-09 NOTE — NURSING NOTE
Chief Complaint   Patient presents with     RECHECK     AUS follow up, leakage per pt       Blood pressure (!) 153/81, pulse 65, height 1.829 m (6'), weight 97.5 kg (215 lb). Body mass index is 29.16 kg/m .    Patient Active Problem List   Diagnosis     Malignant neoplasm of prostate (H)     Prostate cancer (H)     History of colon polyps       No Known Allergies    Current Outpatient Medications   Medication Sig Dispense Refill     acetaminophen (TYLENOL) 325 MG tablet Take 2 tablets (650 mg) by mouth every 4 hours as needed for other (mild pain) 100 tablet 0     aspirin 81 MG tablet Take  by mouth daily.       ibuprofen (ADVIL/MOTRIN) 400 MG tablet        losartan (COZAAR) 50 MG tablet        Multiple Vitamins-Minerals (CENTRUM SILVER ULTRA MENS PO)        SUMAtriptan (IMITREX) 50 MG tablet          Social History     Tobacco Use     Smoking status: Never Smoker     Smokeless tobacco: Never Used   Substance Use Topics     Alcohol use: No     Drug use: No       Perlita Jacobson, EMT  9/9/2019  1:02 PM

## 2019-09-09 NOTE — NURSING NOTE
Chief Complaint   Patient presents with     RECHECK     AUS follow up, leakage per pt       Blood pressure (!) 153/81, pulse 65, height 1.829 m (6'), weight 97.5 kg (215 lb). Body mass index is 29.16 kg/m .    Patient Active Problem List   Diagnosis     Malignant neoplasm of prostate (H)     Prostate cancer (H)     History of colon polyps       No Known Allergies    Current Outpatient Medications   Medication Sig Dispense Refill     acetaminophen (TYLENOL) 325 MG tablet Take 2 tablets (650 mg) by mouth every 4 hours as needed for other (mild pain) 100 tablet 0     aspirin 81 MG tablet Take  by mouth daily.       ibuprofen (ADVIL/MOTRIN) 400 MG tablet        losartan (COZAAR) 50 MG tablet        Multiple Vitamins-Minerals (CENTRUM SILVER ULTRA MENS PO)        SUMAtriptan (IMITREX) 50 MG tablet          Social History     Tobacco Use     Smoking status: Never Smoker     Smokeless tobacco: Never Used   Substance Use Topics     Alcohol use: No     Drug use: No       Invasive Procedure Safety Checklist:    Procedure: Cystoscopy    Action: Complete sections and checkboxes as appropriate.    Pre-procedure:  1. Patient ID Verified with 2 identifiers (Gerri and  or MRN) : YES    2. Procedure and site verified with patient/designee (when able) : YES    3. Accurate consent documentation in medical record : YES    4. H&P (or appropriate assessment) documented in medical record : N/A  H&P must be up to 30 days prior to procedure an updated within 24 hours of                 Procedure as applicable.     5. Relevant diagnostic and radiology test results appropriately labeled and displayed as applicable : YES    6. Blood products, implants, devices, and/or special equipment available for the procedure as applicable : YES    7. Procedure site(s) marked with provider initials [Exclusions: none] : NO    8. Marking not required. Reason : Yes  Procedure does not require site marking    Time Out:     Time-Out performed immediately  prior to starting procedure, including verbal and active participation of all team members addressing: YES    1. Correct patient identity.  2. Confirmed that the correct side and site are marked.  3. An accurate procedure to be done.  4. Agreement on the procedure to be done.  5. Correct patient position.  6. Relevant images and results are properly labeled and appropriately displayed.  7. The need to administer antibiotics or fluids for irrigation purposes during the procedure as applicable.  8. Safety precautions based on patient history or medication use.    During Procedure: Verification of correct person, site, and procedure occurs any time the responsibility for care of the patient is transferred to another member of the care team.    The following medication was given:     MEDICATION:  Lidocaine without epinephrine 2% jelly  ROUTE: Urethral  SITE: Urethra via the meatus  DOSE: 10 mL  LOT #: [id number]  : [employer]  EXPIRATION DATE: 4-2021  NDC#: [address]-85   Was there drug waste? No    Prior to med admin, verified patient identity using patient's name and date of birth.  Due to med administration, patient instructed to remain in clinic for 15 minutes  afterwards, and to report any adverse reaction to me immediately.    Drug Amount Wasted:  None.  Vial/Syringe: Syringe      Perlita Jacobson  9/9/2019  1:46 PM

## 2019-09-09 NOTE — LETTER
9/9/2019       RE: Jesus Landa  [address] [address] [address]     Dear Colleague,    Thank you for referring your patient, Jesus Landa, to the [facility] UROLOGY AND INST FOR PROSTATE AND UROLOGIC CANCERS at [facility]. Please see a copy of my visit note below.    Follow up for urinary incontinence after AUS    HPI:  Jesus Landa is a 57 year old male who is s/p artificial urinary sphincter placement 12/15/2017 by Dr. Mitchell.   Last seen Jan 2018, very pleased and dry at that time.    Today he comes for leakage that is back to his baseline prior to AUS placement.  Started 1 month ago.   States he does not need to cycle his AUS at all to void.   Feels he empties his bladder well.   No UTIs. No swelling or pain or fevers.   Leakage depends on how much he drinks, up to 3 pads per day.  No hematuria.       Relevant history:   RALP in 2/2013 (pT2cN0)  Salvage EBRT 1.5 years later  5cm AUS cuff placed around bulbar urethra      Current Outpatient Medications   Medication Sig Dispense Refill     acetaminophen (TYLENOL) 325 MG tablet Take 2 tablets (650 mg) by mouth every 4 hours as needed for other (mild pain) 100 tablet 0     aspirin 81 MG tablet Take  by mouth daily.       ibuprofen (ADVIL/MOTRIN) 400 MG tablet        losartan (COZAAR) 50 MG tablet        Multiple Vitamins-Minerals (CENTRUM SILVER ULTRA MENS PO)        SUMAtriptan (IMITREX) 50 MG tablet          No Known Allergies  Past Medical History:   Diagnosis Date     Colon polyps      Diverticulosis      Hyperlipidemia      Hypertension      Left cornea abrasion      Prostate cancer (H)      Tympanic membrane perforation      Past Surgical History:   Procedure Laterality Date     amputation of index finger[      with neurectomy each right finger tip     C ORAL SURGERY PROCEDURE       CHOLECYSTECTOMY       COLONOSCOPY       CYSTOSCOPY N/A 12/15/2017    Procedure: CYSTOSCOPY;;  Surgeon: Rm Mitchell  MD Scottie;  [facility]     DAVINCI PROSTATECTOMY  2/1/2013    Procedure: DAVINCI PROSTATECTOMY;  Davinci Radical  Prostatectomy, Bilateral Pelvic Lymhadenectomy  ;  Surgeon: Idania Sparks MD;  Location: [facility] OR     HC COLONOSCOPY THRU STOMA W BIOPSY/CAUTERY TUMOR/POLYP/LESION       IMPLANT PROSTHESIS SPHINCTER URINARY N/A 12/15/2017    Procedure: IMPLANT PROSTHESIS SPHINCTER URINARY;  Insertion of Artificial Urinary Sphincter; Cystoscopy;  Surgeon: Rm Mitchell MD;  [facility]         ROS -see hpi otherwise rest is negative   OBJECTIVE:  Vitals:    09/09/19 1300   BP: (!) 153/81   Pulse: 65   Weight: 97.5 kg (215 lb)   Height: 1.829 m (6')     EXAM:  Constitutional: healthy, alert and no distress   Cardiovascular: regular rate, normal perfusion  Respiratory: normal work of breathing  Psychiatric: mentation appears normal and affect normal/bright  Head: Normocephalic. EOMI. Moist membranes  Abdomen: Abdomen soft, non-tender.   : circ male, adequate meatus, glans without lesion, scrotum is symmetric, pump palpable in scrotum without edema or pain, cycles easily, cuff palpable in perineum without tenderness  NEURO: Gait normal. Grossly non-focal  SKIN: Soft, dry  JOINT/EXTREMITIES: extremities normal - no gross deformities noted and gait normal      PROCEDURE:  Jesus Landa was consented and placed in the lithotomy position. Pre-procedural antibiotics were not given. His genitalia were cleaned and prepared in the usual fashion.  Lidocaine gel was inserted into the urethra and given time to take effect.  A 16 Fr flexible cystoscope was then inserted through the urethra. The urethra was normal to the level of the artificial sphincter. The sphincter was incompletely coapted and could probably accommodate the scope. The cuff was cycled and opened slightly but not much. The scope passed the artifical sphincter in the mid-bulbar urethra and just proximal to the this there was a 15Fr very short  and soft appearing urethral stricture. I could see the external sphincter past this. Did not pass this with a scope since he is asymptomatic from this. The cystoscope was then withdrawn.  He tolerated the procedure well.      Assessment/Plan: Jesus Landa is a 59 year old male with recurrent urinary incontinence after AUS. PVR 20cc. Cysto today shows poorly coapted urethra at level of cuff. Also an asymptomatic proximal bulbar stricture ~15 Fr.     -- Differential is urethral atrophy vs leak of fluid from system  -- Will discuss patient with Dr. Mitchell and contact patient regarding treatment options.       Mitra Allan MD  Reconstructive Urology Fellow      Answers for HPI/ROS submitted by the patient on 9/9/2019   PHQ9 TOTAL SCORE: 1      Again, thank you for allowing me to participate in the care of your patient.      Sincerely,    Xiao Allan MD

## 2019-09-11 ENCOUNTER — TELEPHONE (OUTPATIENT)
Dept: UROLOGY | Facility: CLINIC | Age: 59
End: 2019-09-11

## 2019-09-17 ENCOUNTER — PRE VISIT (OUTPATIENT)
Dept: UROLOGY | Facility: CLINIC | Age: 59
End: 2019-09-17

## 2019-09-17 NOTE — TELEPHONE ENCOUNTER
Reason for visit: AUS follow up     Relevant information: Pt saw Dr. Allan    Records/imaging/labs/orders: all records available    Pt called: no need for a call    At Rooming: regular

## 2019-09-18 ENCOUNTER — DOCUMENTATION ONLY (OUTPATIENT)
Dept: CARE COORDINATION | Facility: CLINIC | Age: 59
End: 2019-09-18

## 2019-09-23 ENCOUNTER — VIRTUAL VISIT (OUTPATIENT)
Dept: UROLOGY | Facility: CLINIC | Age: 59
End: 2019-09-23
Payer: COMMERCIAL

## 2019-09-23 DIAGNOSIS — N39.3 MALE URINARY STRESS INCONTINENCE: Primary | ICD-10-CM

## 2019-09-23 RX ORDER — CEFAZOLIN SODIUM 1 G/50ML
1 INJECTION, SOLUTION INTRAVENOUS SEE ADMIN INSTRUCTIONS
Status: CANCELLED | OUTPATIENT
Start: 2019-09-23

## 2019-09-23 RX ORDER — CEFAZOLIN SODIUM 2 G/50ML
2 SOLUTION INTRAVENOUS
Status: CANCELLED | OUTPATIENT
Start: 2019-09-23

## 2019-09-23 NOTE — PROGRESS NOTES
Patient opted to conduct today's return visit via telephone vs an in person visit to the clinic.    I spoke with: patient and wife    The reason for the telephone visit was: Recurrent incontinence after previous artificial urinary sphincter placement 2 years ago.  He has a history of radical prostatectomy and radiotherapy.    Pertinent history and review of systems: The incontinence has gotten slowly worse over the last several months.  The device feels like it continues to pump normally per the patient.    Assessment: We discussed the differential diagnosis of recurrent incontinence after artificial urinary sphincter placement including device malfunction or urethral atrophy.  Given the fact that the pump cycles normally and the fact that he had prior radiation and the time course of events this is most likely to be sub-cuff atrophy.     Advice/instructions given to patient/guardian including prescriptions, follow up appointment or orders for diagnostic testing: I explained to him that we would explore him through perineal incision and remove the old cuff and examined the urethra.  If it was consistent with sub-cuff atrophy then we would downsize the cuff in the same location and repeat cystoscopy to see if we had better coaptation.  If there was not a perfect outcome or if there is any question of leak of the device during the surgery then we did explore the other components.  He understands surgery may take as little as half an hour and is much as an hour and a half depending on how many components we have to replace.    He is going to finish up his hard to schedule and then call us when he decides what time of year he wants to do the surgery    Phone call contact time    Call Started at: 5:05 PM    Call Ended at: 5:15 PM

## 2019-11-04 ENCOUNTER — TELEPHONE (OUTPATIENT)
Dept: UROLOGY | Facility: CLINIC | Age: 59
End: 2019-11-04

## 2019-11-04 PROBLEM — N39.3 MALE URINARY STRESS INCONTINENCE: Status: ACTIVE | Noted: 2019-11-04

## 2019-11-05 NOTE — TELEPHONE ENCOUNTER
Patient is scheduled for surgery with Dr. Mitchell      Spoke or left message with: Jesus    Date of Surgery: 1/2/20    Location: [facility] OR    Informed patient they will need an adult  yes    Pre-op with surgeon (if applicable): n/a    H&P: Scheduled with pcp    Additional imaging/appointments: n/a    Surgery packet: mailed 11/5/19     Additional comments: patient called back to schedule

## 2019-12-30 RX ORDER — CETIRIZINE HYDROCHLORIDE 10 MG/1
10 TABLET ORAL DAILY
COMMUNITY

## 2019-12-31 ENCOUNTER — ANESTHESIA EVENT (OUTPATIENT)
Dept: SURGERY | Facility: CLINIC | Age: 59
End: 2019-12-31
Payer: COMMERCIAL

## 2020-01-01 NOTE — ANESTHESIA PREPROCEDURE EVALUATION
Anesthesia Pre-Procedure Evaluation    Patient: Jesus Landa   MRN:     [id number] Gender:   male   Age:    59 year old :      1960        Preoperative Diagnosis: Male urinary stress incontinence [N39.3]   Procedure(s):  REVISION, ARTIFICIAL URINARY SPHINCTER     Past Medical History:   Diagnosis Date     Colon polyps      Diverticulosis      Hyperlipidemia      Hypertension      Left cornea abrasion      Prostate cancer (H)      Tympanic membrane perforation       Past Surgical History:   Procedure Laterality Date     amputation of index finger[      with neurectomy each right finger tip     C ORAL SURGERY PROCEDURE       CHOLECYSTECTOMY       COLONOSCOPY       CYSTOSCOPY N/A 12/15/2017    Procedure: CYSTOSCOPY;;  Surgeon: Rm Mitchell MD;  [facility]     DAVINCI PROSTATECTOMY  2013    Procedure: DAVINCI PROSTATECTOMY;  Davinci Radical  Prostatectomy, Bilateral Pelvic Lymhadenectomy  ;  Surgeon: Idania Sparks MD;  Location: [facility] OR     HC COLONOSCOPY THRU STOMA W BIOPSY/CAUTERY TUMOR/POLYP/LESION       IMPLANT PROSTHESIS SPHINCTER URINARY N/A 12/15/2017    Procedure: IMPLANT PROSTHESIS SPHINCTER URINARY;  Insertion of Artificial Urinary Sphincter; Cystoscopy;  Surgeon: Rm Mitchell MD;  [facility]          Anesthesia Evaluation     . Pt has had prior anesthetic. Type: General and Regional    No history of anesthetic complications          ROS/MED HX    ENT/Pulmonary:  - neg pulmonary ROS     Neurologic:     (+)migraines,     Cardiovascular:     (+) Dyslipidemia, hypertension----. : . . . :. .       METS/Exercise Tolerance:  >4 METS   Hematologic:  - neg hematologic  ROS       Musculoskeletal:   (+) arthritis,  -       GI/Hepatic:  - neg GI/hepatic ROS       Renal/Genitourinary:  - ROS Renal section negative       Endo:  - neg endo ROS       Psychiatric:  - neg psychiatric ROS       Infectious Disease:  - neg infectious disease ROS       Malignancy:   (+) Malignancy  History of Prostate  Prostate CA Remission status post Surgery,         Other:    - neg other ROS                     PHYSICAL EXAM:   Mental Status/Neuro: A/A/O   Airway: Facies: Feasible  Mallampati: I  Mouth/Opening: Full  TM distance: > 6 cm  Neck ROM: Full   Respiratory: Auscultation: CTAB     Resp. Rate: Normal     Resp. Effort: Normal      CV: Rhythm: Regular  Rate: Age appropriate  Heart: Normal Sounds  Edema: None   Comments:      Dental: Normal Dentition                LABS:  CBC:   Lab Results   Component Value Date    HGB 11.5 (L) 02/02/2013     BMP:   Lab Results   Component Value Date     02/02/2013    POTASSIUM 4.3 02/02/2013    CHLORIDE 104 02/02/2013    CO2 27 02/02/2013    BUN 14 02/02/2013    CR 0.89 02/02/2013     (H) 02/02/2013     COAGS: No results found for: PTT, INR, FIBR  POC:   Lab Results   Component Value Date     (H) 02/02/2013     OTHER:   Lab Results   Component Value Date    JOSE 8.5 02/02/2013        Preop Vitals    BP Readings from Last 3 Encounters:   09/09/19 (!) 153/81   01/08/18 152/86   12/26/17 142/78    Pulse Readings from Last 3 Encounters:   09/09/19 65   01/08/18 68   12/26/17 78      Resp Readings from Last 3 Encounters:   12/26/17 16   12/15/17 14   02/02/13 16    SpO2 Readings from Last 3 Encounters:   12/26/17 100%   12/15/17 98%   02/02/13 96%      Temp Readings from Last 1 Encounters:   12/26/17 36.4  C (97.5  F) (Oral)    Ht Readings from Last 1 Encounters:   09/09/19 1.829 m (6')      Wt Readings from Last 1 Encounters:   09/09/19 97.5 kg (215 lb)    Estimated body mass index is 29.16 kg/m  as calculated from the following:    Height as of 9/9/19: 1.829 m (6').    Weight as of 9/9/19: 97.5 kg (215 lb).     LDA:  Urethral Catheter Latex;Straight-tip;Double-lumen 20 fr (Active)   Number of days: 2525        Assessment:   ASA SCORE: 3    H&P: History and physical reviewed and following examination; no interval change.   Smoking Status:   Non-Smoker/Unknown   NPO Status: NPO Appropriate     Plan:   Anes. Type:  General   Pre-Medication: None   Induction:  IV (Standard)   Airway: LMA   Access/Monitoring: PIV   Maintenance: Balanced     Postop Plan:   Postop Pain: Opioids  Postop Sedation/Airway: Not planned  Disposition: Outpatient     PONV Management:   Adult Risk Factors:, Non-Smoker, Postop Opioids   Prevention: Ondansetron     CONSENT: Direct conversation   Plan and risks discussed with: Patient   Blood Products: Consent Deferred (Minimal Blood Loss)       Comments for Plan/Consent:  12/15/17; 0742; Not attempted (Native Airway); Intravenous; Easy; 5; mm; laryngeal mask airway; CRNA; Spontaneous ventilation, Adequate tidal volume, Follows commands,  strength adequate, Head lift adequate, Purposeful movement    02/01/13; 0810; Airway Size: 7.5;  Cuffed;  Oral endotracheal tube;  Blade Type: Werner;  Blade Size: 2;  Place by: [place] post attempt by Med Student x1;  Insertion Attempts: 1;  Secured at (cm)to lip: 23 cm;  Breath Sounds: Equal, clear and bilateral;  End Tidal CO2: Present;  Dentition: Intact;  Grade View of Cords: 1                 Carlota Briseno MD

## 2020-01-02 ENCOUNTER — HOSPITAL ENCOUNTER (OUTPATIENT)
Facility: CLINIC | Age: 60
Discharge: HOME | End: 2020-01-02
Attending: UROLOGY | Admitting: UROLOGY
Payer: COMMERCIAL

## 2020-01-02 ENCOUNTER — ANESTHESIA (OUTPATIENT)
Dept: SURGERY | Facility: CLINIC | Age: 60
End: 2020-01-02
Payer: COMMERCIAL

## 2020-01-02 VITALS
HEIGHT: 72 IN | BODY MASS INDEX: 29.08 KG/M2 | SYSTOLIC BLOOD PRESSURE: 139 MMHG | HEART RATE: 60 BPM | DIASTOLIC BLOOD PRESSURE: 90 MMHG | TEMPERATURE: 97.6 F | WEIGHT: 214.73 LBS | RESPIRATION RATE: 14 BRPM | OXYGEN SATURATION: 100 %

## 2020-01-02 DIAGNOSIS — N39.3 MALE URINARY STRESS INCONTINENCE: ICD-10-CM

## 2020-01-02 LAB — GLUCOSE BLDC GLUCOMTR-MCNC: 90 MG/DL (ref 70–99)

## 2020-01-02 PROCEDURE — 40000170 ZZH STATISTIC PRE-PROCEDURE ASSESSMENT II: Performed by: UROLOGY

## 2020-01-02 PROCEDURE — 37000008 ZZH ANESTHESIA TECHNICAL FEE, 1ST 30 MIN: Performed by: UROLOGY

## 2020-01-02 PROCEDURE — 27810169 ZZH OR IMPLANT GENERAL: Performed by: UROLOGY

## 2020-01-02 PROCEDURE — 25000128 H RX IP 250 OP 636: Performed by: UROLOGY

## 2020-01-02 PROCEDURE — 88300 SURGICAL PATH GROSS: CPT | Performed by: UROLOGY

## 2020-01-02 PROCEDURE — 37000009 ZZH ANESTHESIA TECHNICAL FEE, EACH ADDTL 15 MIN: Performed by: UROLOGY

## 2020-01-02 PROCEDURE — 25000125 ZZHC RX 250: Performed by: NURSE ANESTHETIST, CERTIFIED REGISTERED

## 2020-01-02 PROCEDURE — 25800030 ZZH RX IP 258 OP 636: Performed by: NURSE ANESTHETIST, CERTIFIED REGISTERED

## 2020-01-02 PROCEDURE — 71000014 ZZH RECOVERY PHASE 1 LEVEL 2 FIRST HR: Performed by: UROLOGY

## 2020-01-02 PROCEDURE — 25000128 H RX IP 250 OP 636: Performed by: NURSE ANESTHETIST, CERTIFIED REGISTERED

## 2020-01-02 PROCEDURE — 36000059 ZZH SURGERY LEVEL 3 EA 15 ADDTL MIN UMMC: Performed by: UROLOGY

## 2020-01-02 PROCEDURE — 25000565 ZZH ISOFLURANE, EA 15 MIN: Performed by: UROLOGY

## 2020-01-02 PROCEDURE — C1815 PROS, URINARY SPH, IMP: HCPCS | Performed by: UROLOGY

## 2020-01-02 PROCEDURE — 25000132 ZZH RX MED GY IP 250 OP 250 PS 637: Performed by: ANESTHESIOLOGY

## 2020-01-02 PROCEDURE — 36000057 ZZH SURGERY LEVEL 3 1ST 30 MIN - UMMC: Performed by: UROLOGY

## 2020-01-02 PROCEDURE — 71000027 ZZH RECOVERY PHASE 2 EACH 15 MINS: Performed by: UROLOGY

## 2020-01-02 PROCEDURE — 25000566 ZZH SEVOFLURANE, EA 15 MIN: Performed by: UROLOGY

## 2020-01-02 PROCEDURE — 27210794 ZZH OR GENERAL SUPPLY STERILE: Performed by: UROLOGY

## 2020-01-02 PROCEDURE — 82962 GLUCOSE BLOOD TEST: CPT

## 2020-01-02 DEVICE — IMP URINARY SPHINCTER AMS 800 PUMP SYSTEM 72404127: Type: IMPLANTABLE DEVICE | Site: SCROTUM | Status: FUNCTIONAL

## 2020-01-02 DEVICE — IMP URINARY SPHINCTER CUFF OCCLUSIVE AMS 4.0CM 72404130: Type: IMPLANTABLE DEVICE | Site: URETHRA | Status: FUNCTIONAL

## 2020-01-02 DEVICE — IMP URINARY SPHINCTER BALLOON AMS 61-70CM 72400024: Type: IMPLANTABLE DEVICE | Site: ABDOMEN | Status: FUNCTIONAL

## 2020-01-02 RX ORDER — FENTANYL CITRATE 50 UG/ML
25-50 INJECTION, SOLUTION INTRAMUSCULAR; INTRAVENOUS
Status: DISCONTINUED | OUTPATIENT
Start: 2020-01-02 | End: 2020-01-02 | Stop reason: HOSPADM

## 2020-01-02 RX ORDER — SENNOSIDES A AND B 8.6 MG/1
1 TABLET, FILM COATED ORAL DAILY
Qty: 10 TABLET | Refills: 0 | Status: SHIPPED | OUTPATIENT
Start: 2020-01-02

## 2020-01-02 RX ORDER — FENTANYL CITRATE 50 UG/ML
INJECTION, SOLUTION INTRAMUSCULAR; INTRAVENOUS PRN
Status: DISCONTINUED | OUTPATIENT
Start: 2020-01-02 | End: 2020-01-02

## 2020-01-02 RX ORDER — SODIUM CHLORIDE, SODIUM LACTATE, POTASSIUM CHLORIDE, CALCIUM CHLORIDE 600; 310; 30; 20 MG/100ML; MG/100ML; MG/100ML; MG/100ML
INJECTION, SOLUTION INTRAVENOUS CONTINUOUS
Status: DISCONTINUED | OUTPATIENT
Start: 2020-01-02 | End: 2020-01-02 | Stop reason: HOSPADM

## 2020-01-02 RX ORDER — OXYCODONE HYDROCHLORIDE 5 MG/1
5 TABLET ORAL ONCE
Status: COMPLETED | OUTPATIENT
Start: 2020-01-02 | End: 2020-01-02

## 2020-01-02 RX ORDER — METOPROLOL TARTRATE 1 MG/ML
1-2 INJECTION, SOLUTION INTRAVENOUS EVERY 5 MIN PRN
Status: DISCONTINUED | OUTPATIENT
Start: 2020-01-02 | End: 2020-01-02 | Stop reason: HOSPADM

## 2020-01-02 RX ORDER — CEFAZOLIN SODIUM 2 G/100ML
2 INJECTION, SOLUTION INTRAVENOUS
Status: COMPLETED | OUTPATIENT
Start: 2020-01-02 | End: 2020-01-02

## 2020-01-02 RX ORDER — HYDRALAZINE HYDROCHLORIDE 20 MG/ML
2.5-5 INJECTION INTRAMUSCULAR; INTRAVENOUS EVERY 10 MIN PRN
Status: DISCONTINUED | OUTPATIENT
Start: 2020-01-02 | End: 2020-01-02 | Stop reason: HOSPADM

## 2020-01-02 RX ORDER — MEPERIDINE HYDROCHLORIDE 25 MG/ML
12.5 INJECTION INTRAMUSCULAR; INTRAVENOUS; SUBCUTANEOUS
Status: DISCONTINUED | OUTPATIENT
Start: 2020-01-02 | End: 2020-01-02 | Stop reason: HOSPADM

## 2020-01-02 RX ORDER — NALOXONE HYDROCHLORIDE 0.4 MG/ML
.1-.4 INJECTION, SOLUTION INTRAMUSCULAR; INTRAVENOUS; SUBCUTANEOUS
Status: DISCONTINUED | OUTPATIENT
Start: 2020-01-02 | End: 2020-01-02 | Stop reason: HOSPADM

## 2020-01-02 RX ORDER — ONDANSETRON 2 MG/ML
INJECTION INTRAMUSCULAR; INTRAVENOUS PRN
Status: DISCONTINUED | OUTPATIENT
Start: 2020-01-02 | End: 2020-01-02

## 2020-01-02 RX ORDER — CEFAZOLIN SODIUM 1 G/3ML
1 INJECTION, POWDER, FOR SOLUTION INTRAMUSCULAR; INTRAVENOUS SEE ADMIN INSTRUCTIONS
Status: DISCONTINUED | OUTPATIENT
Start: 2020-01-02 | End: 2020-01-02 | Stop reason: HOSPADM

## 2020-01-02 RX ORDER — SODIUM CHLORIDE, SODIUM LACTATE, POTASSIUM CHLORIDE, CALCIUM CHLORIDE 600; 310; 30; 20 MG/100ML; MG/100ML; MG/100ML; MG/100ML
INJECTION, SOLUTION INTRAVENOUS CONTINUOUS PRN
Status: DISCONTINUED | OUTPATIENT
Start: 2020-01-02 | End: 2020-01-02

## 2020-01-02 RX ORDER — LIDOCAINE HYDROCHLORIDE 20 MG/ML
INJECTION, SOLUTION INFILTRATION; PERINEURAL PRN
Status: DISCONTINUED | OUTPATIENT
Start: 2020-01-02 | End: 2020-01-02

## 2020-01-02 RX ORDER — LIDOCAINE 40 MG/G
CREAM TOPICAL
Status: DISCONTINUED | OUTPATIENT
Start: 2020-01-02 | End: 2020-01-02 | Stop reason: HOSPADM

## 2020-01-02 RX ORDER — ALBUTEROL SULFATE 0.83 MG/ML
2.5 SOLUTION RESPIRATORY (INHALATION) EVERY 4 HOURS PRN
Status: DISCONTINUED | OUTPATIENT
Start: 2020-01-02 | End: 2020-01-02 | Stop reason: HOSPADM

## 2020-01-02 RX ORDER — ONDANSETRON 2 MG/ML
4 INJECTION INTRAMUSCULAR; INTRAVENOUS EVERY 30 MIN PRN
Status: DISCONTINUED | OUTPATIENT
Start: 2020-01-02 | End: 2020-01-02 | Stop reason: HOSPADM

## 2020-01-02 RX ORDER — OXYCODONE HYDROCHLORIDE 5 MG/1
5 TABLET ORAL EVERY 6 HOURS PRN
Qty: 12 TABLET | Refills: 0 | Status: SHIPPED | OUTPATIENT
Start: 2020-01-02 | End: 2020-01-05

## 2020-01-02 RX ORDER — DIMENHYDRINATE 50 MG/ML
25 INJECTION, SOLUTION INTRAMUSCULAR; INTRAVENOUS
Status: DISCONTINUED | OUTPATIENT
Start: 2020-01-02 | End: 2020-01-02 | Stop reason: HOSPADM

## 2020-01-02 RX ORDER — HYDROMORPHONE HYDROCHLORIDE 1 MG/ML
.3-.5 INJECTION, SOLUTION INTRAMUSCULAR; INTRAVENOUS; SUBCUTANEOUS EVERY 10 MIN PRN
Status: DISCONTINUED | OUTPATIENT
Start: 2020-01-02 | End: 2020-01-02 | Stop reason: HOSPADM

## 2020-01-02 RX ORDER — ONDANSETRON 4 MG/1
4 TABLET, ORALLY DISINTEGRATING ORAL EVERY 30 MIN PRN
Status: DISCONTINUED | OUTPATIENT
Start: 2020-01-02 | End: 2020-01-02 | Stop reason: HOSPADM

## 2020-01-02 RX ORDER — PROPOFOL 10 MG/ML
INJECTION, EMULSION INTRAVENOUS PRN
Status: DISCONTINUED | OUTPATIENT
Start: 2020-01-02 | End: 2020-01-02

## 2020-01-02 RX ADMIN — FENTANYL CITRATE 75 MCG: 50 INJECTION, SOLUTION INTRAMUSCULAR; INTRAVENOUS at 10:37

## 2020-01-02 RX ADMIN — FENTANYL CITRATE 25 MCG: 50 INJECTION, SOLUTION INTRAMUSCULAR; INTRAVENOUS at 11:00

## 2020-01-02 RX ADMIN — FENTANYL CITRATE 50 MCG: 50 INJECTION, SOLUTION INTRAMUSCULAR; INTRAVENOUS at 11:18

## 2020-01-02 RX ADMIN — ONDANSETRON 4 MG: 2 INJECTION INTRAMUSCULAR; INTRAVENOUS at 11:52

## 2020-01-02 RX ADMIN — CEFAZOLIN SODIUM 2 G: 2 INJECTION, SOLUTION INTRAVENOUS at 10:50

## 2020-01-02 RX ADMIN — OXYCODONE HYDROCHLORIDE 5 MG: 5 TABLET ORAL at 13:36

## 2020-01-02 RX ADMIN — PROPOFOL 160 MG: 10 INJECTION, EMULSION INTRAVENOUS at 10:37

## 2020-01-02 RX ADMIN — FENTANYL CITRATE 25 MCG: 50 INJECTION, SOLUTION INTRAMUSCULAR; INTRAVENOUS at 11:58

## 2020-01-02 RX ADMIN — FENTANYL CITRATE 50 MCG: 50 INJECTION, SOLUTION INTRAMUSCULAR; INTRAVENOUS at 11:24

## 2020-01-02 RX ADMIN — SODIUM CHLORIDE, POTASSIUM CHLORIDE, SODIUM LACTATE AND CALCIUM CHLORIDE: 600; 310; 30; 20 INJECTION, SOLUTION INTRAVENOUS at 10:25

## 2020-01-02 RX ADMIN — MIDAZOLAM 2 MG: 1 INJECTION INTRAMUSCULAR; INTRAVENOUS at 10:25

## 2020-01-02 RX ADMIN — LIDOCAINE HYDROCHLORIDE 100 MG: 20 INJECTION, SOLUTION INFILTRATION; PERINEURAL at 10:37

## 2020-01-02 RX ADMIN — FENTANYL CITRATE 25 MCG: 50 INJECTION, SOLUTION INTRAMUSCULAR; INTRAVENOUS at 11:50

## 2020-01-02 ASSESSMENT — MIFFLIN-ST. JEOR: SCORE: 1827

## 2020-01-02 NOTE — DISCHARGE INSTRUCTIONS
[facility], [place]  Same-Day Surgery   Adult Discharge Orders & Instructions       *Take it easy when you get home.  Remember, same day surgery DOES NOT MEAN SAME DAY RECOVERY!    *Healing is a gradual process and you will need some time to recover - you may be more tired than you realize at first.    *Rest and relax for at least the first 24 hours at home.  You'll feel better and heal faster if you take good care of yourself.    For 24 hours after surgery    1. A responsible adult must stay with you for at least 24 hours after you leave the hospital.   2. Do not drink alcohol, drive or use heavy equipment for 24 hours. This is because you have had anesthesia medications.  3.  Avoid strenuous or risky activities for 24 hours.  Ask for help when climbing stairs.   4. You may feel lightheaded, if so, sit for a few minutes before standing.  You may need someone to help you get up.   5. If you have nausea (feel sick to your stomach): Drink only clear liquids such as water, apple juice, ginger ale, or broth. Rest may also help. Be sure to drink enough fluids. Move to a regular diet as you feel able.  6. You may have a slight fever. Call the doctor if your fever is over 100 F (37.7 C) (taken under the tongue) or lasts longer than 24 hours.  7. You may have a dry mouth, a sore throat, muscle aches or trouble sleeping.  These should go away after 24 hours.  8. Do not make important or legal decisions.     Call your doctor for any of the followin.  Signs of infection: fever, growing tenderness at the surgery site, a large amount of drainage or bleeding, severe pain, foul-smelling drainage, redness, swelling. Please call if you experience any of these symptoms.    2. If it has been 8 to 10 hours since surgery and you are still not able to urinate (pass water), please call.    3.  If you have a headache for over 24 hours, please call.    To contact a doctor, call Dr Sosa's office at  "[phone] (clinic) or:    '   [phone] and ask for \"the resident on call for Urology\" (this is the hospital  and is answered 24 hours a day)    '   Emergency Department: [facility]: [phone]       (TTY for hearing impaired: [phone])    "

## 2020-01-02 NOTE — ANESTHESIA CARE TRANSFER NOTE
Patient: Jesus Landa    Procedure(s):  REVISION AND PLACEMENT OF  ALL COMPONENTS OF ARTIFICIAL URINARY SPHINCTER, CYSTOSCOPY    Diagnosis: Male urinary stress incontinence [N39.3]  Diagnosis Additional Information: No value filed.    Anesthesia Type:   General     Note:  Airway :Face Mask  Patient transferred to:PACU  Comments: Awake, responding appropriately. Stable, report to RN.Handoff Report: Identifed the Patient, Identified the Reponsible Provider, Reviewed the pertinent medical history, Discussed the surgical course, Reviewed Intra-OP anesthesia mangement and issues during anesthesia, Set expectations for post-procedure period and Allowed opportunity for questions and acknowledgement of understanding      Vitals: (Last set prior to Anesthesia Care Transfer)    CRNA VITALS  1/2/2020 1144 - 1/2/2020 1219      1/2/2020             EKG:  Sinus rhythm                Electronically Signed By: ALFRED Chang CRNA  January 2, 2020  12:19 PM

## 2020-01-02 NOTE — ANESTHESIA POSTPROCEDURE EVALUATION
Anesthesia POST Procedure Evaluation    Patient: Jesus Landa   MRN:     [id number] Gender:   male   Age:    59 year old :      1960        Preoperative Diagnosis: Male urinary stress incontinence [N39.3]   Procedure(s):  REVISION AND PLACEMENT OF  ALL COMPONENTS OF ARTIFICIAL URINARY SPHINCTER, CYSTOSCOPY   Postop Comments: No value filed.       Anesthesia Type:  Not documented  General    Reportable Event: NO     PAIN: Uncomplicated   Sign Out status: Comfortable, Well controlled pain     PONV: No PONV   Sign Out status:  No Nausea or Vomiting     Neuro/Psych: Uneventful perioperative course   Sign Out Status: Preoperative baseline; Age appropriate mentation     Airway/Resp.: Uneventful perioperative course   Sign Out Status: Non labored breathing, age appropriate RR; Resp. Status within EXPECTED Parameters     CV: Uneventful perioperative course   Sign Out status: Appropriate BP and perfusion indices; Appropriate HR/Rhythm     Disposition:   Sign Out in:  PACU  Disposition:  Phase II; Home  Recovery Course: Uneventful  Follow-Up: Not required           Last Anesthesia Record Vitals:  CRNA VITALS  2020 1144 - 2020 1244      2020             EKG:  Sinus rhythm          Last PACU Vitals:  Vitals Value Taken Time   /87 2020 12:30 PM   Temp 36  C (96.8  F) 2020 12:20 PM   Pulse 66 2020 12:30 PM   Resp 14 2020 12:30 PM   SpO2 97 % 2020 12:45 PM   Temp src     NIBP     Pulse     SpO2     Resp     Temp     Ht Rate     Temp 2     Vitals shown include unvalidated device data.      Electronically Signed By: Carlota Briseno MD, 2020, 12:46 PM

## 2020-01-02 NOTE — BRIEF OP NOTE
[facility], [place]    Brief Operative Note    Pre-operative diagnosis: Male urinary stress incontinence [N39.3]  Post-operative diagnosis Same as pre-operative diagnosis    Procedure: Procedure(s):  REVISION AND PLACEMENT OF  ALL COMPONENTS OF ARTIFICIAL URINARY SPHINCTER, CYSTOSCOPY  Surgeon: Surgeon(s) and Role:     * Rm Mitchell MD - Primary     * Arden Souza MD - Resident - Assisting     * Xiao Allan MD - Fellow - Assisting  Anesthesia: General   Estimated blood loss: Minimal  Drains: None  Specimens:   ID Type Source Tests Collected by Time Destination   A : artificial urinary sphincter Other (specify in comments) Other SURGICAL PATHOLOGY EXAM Rm Mitchell MD 1/2/2020 11:26 AM      Findings:   None.  Complications: None.  Implants:   Implant Name Type Inv. Item Serial No.  Lot No. LRB No. Used   IMP URINARY SPHINCTER BALLOON AMS 61-70CM [id number] Metallic Hardware/Weed IMP URINARY SPHINCTER BALLOON AMS 61-70CM [id number]  [employer] [id number] N/A 1   IMP URINARY SPHINCTER  PUMP SYSTEM [id number] Prosthesis IMP URINARY SPHINCTER  PUMP SYSTEM [id number]  [employer] [id number] N/A 1   IMP URINARY SPHINCTER CUFF OCCLUSIVE AMS 5.0CM [id number] Prosthesis IMP URINARY SPHINCTER CUFF OCCLUSIVE AMS 5.0CM [id number]  [employer] [id number] N/A 1   IMP URINARY SPHINCTER BALLOON AMS 61-70CM [id number] Metallic Hardware/Weed IMP URINARY SPHINCTER BALLOON AMS 61-70CM [id number]  [employer] [id number] N/A 1   IMP URINARY SPHINCTER  PUMP SYSTEM [id number] Prosthesis IMP URINARY SPHINCTER  PUMP SYSTEM [id number]  [employer] [id number] N/A 1   IMP URINARY SPHINCTER CUFF OCCLUSIVE AMS 4.0CM [id number] Prosthesis IMP URINARY SPHINCTER CUFF OCCLUSIVE AMS 4.0CM [id number]  [employer] [id number] N/A 1

## 2020-01-02 NOTE — OP NOTE
PREOPERATIVE DIAGNOSIS: Recurrent male stress urinary incontinence.     POSTOPERATIVE DIAGNOSIS: Recurrent male stress urinary incontinence.     PROCEDURES PERFORMED:   1. Artificial urinary sphincter removal and replacement, all components  2. Cystoscopy.     SURGEON: Rm Mitchell MD     RESIDENT: Lebron Souza MD  FELLOW: Mitra Allan MD  ESTIMATED BLOOD LOSS: 5 mL.   COMPLICATIONS: None  TUBES AND DRAINS: None  FINDINGS: Leak in AUS cuff, new 4cm cuff placed in same location.     BRIEF HISTORY OF PRESENT ILLNESS: Jesus Landa is a 59 year old-year-old gentleman with a history of robotic prostatectomy in 2013 and salvage radiation 1.5 years later. Had urinary incontinence managed with AUS placement in 2017 that initially got him nearly dry. His incontinence has recurred and he does not need to cycle the cuff to urinate any longer. Cystoscopy in the office showed poorly coaptated urethra with opening of the cuff with cycling and a 16Fr thin stricture proximal to that which was not symptomatic for him. Risks, benefits and alternatives of AUS revision were discussed including but not limited to bleeding, infection, penile/scrotal pain/numbness, device erosion, urinary retention, persistent urinary incontinence and need for additional procedures.    DESCRIPTION OF PROCEDURE: After informed consent was obtained and pre-operative antibiotics were given in the form of vancomycin and gentamycin, the patient was brought to the operating room. Under general anesthesia he was placed in the low lithotomy position with all pressure points well-padded. The lower abdomen, penis, scrotum and perineum were prepped and draped in the standard sterile fashion. A time out was performed.   We started with cystoscopy that revealed a poorly coapted urethra that opened just slightly more with pumping. The stricture seen previously did appear to be 14Fr now. Again this was very thin. Since his symptoms were purely incontinence related  and not obstructive in any way and his stricture was not <12Fr we decided that we would address his incontinence first and monitor for his stricture in the future. We did pass the stricture with the scope since it would go through this very easily without much if any mucosal disruption. The bladder neck was open. The scope was removed.   A vertical midline perineal incision was made over the prior cuff and carried down through Colle's fascia. The capsule of the old cuff was opened and dissected circumferentially. The tubing was shodded and cut. The old cuff was removed and evaluated and there was an obvious hole in the cuff at one of the folds from its circular shape. We decided at this point that all components had to be removed. We quickly passed a right angle behind the urethra and there was evidence of atrophy as now the urethra measured 4cm rather than 5cm as at his prior surgery. The urethra did otherwise appear healthy so we had the nurses prep the 4cm cuff.   A subinguinal incision was made on the right side over the prior incision. The tubing to the PRB was pulled out of the wound and the connector cut away. The same was done for the tubing to the scrotal pump. The scrotal pump was pulled up through the subinguinal incision, the capsule opened and this was removed. Finally, we turned to the PRB. We attempted to aspirate fluid from the PRB but only 2cc returned and it was yellow in color confirming leak. Then the tubing to the PRB was dissected to the external ring and removed without complication. All wounds and capsules were copiously irrigated. The 4cm cuff was placed around the urethra. The tubing was passed up to the subinguinal incision. The new 61-70 cm H20 PRB was next placed in the prior site and filled with 22cc saline.  The scrotal pump was placed in its prior capsule and a 3-0 Vicryl suture was used to secure this into the pouch. Connections were then all done in the standard fashion in the  "subinguinal wound. The subinguinal wound was closed in 2 layers with 3-0 Vicryl on Jovan's fascia and a 4-0 Monocryl subcuticular stitch. Dermabond was applied.   Cystoscopy was performed again and this revealed a very nice cuff fit.   The perineal wound was closed in 2 layers with 3-0 Vicryl on Colle's fascia and 4-0 Monocryl interrupted on the skin. Bacitracin was applied. Fluffs and a scrotal support were applied. The cuff was left deactivated.     Plan:   RTC in 2 weeks for activation  Will monitor for urethral stricture recurrence with symptom/flow/pvr at 3 months post op    6' 0\"  214 lbs 11.65 oz  Body mass index is 29.12 kg/m .        "

## 2020-01-03 LAB — COPATH REPORT: NORMAL

## 2020-01-13 ENCOUNTER — PRE VISIT (OUTPATIENT)
Dept: UROLOGY | Facility: CLINIC | Age: 60
End: 2020-01-13

## 2020-01-13 NOTE — TELEPHONE ENCOUNTER
Reason for Visit: Post-op follow up, procedure 1/2/2020    Diagnosis: Recurrent male stress urinary incontinence    Orders/Procedures/Records: Records available    Contact Patient: N/A    Rooming Requirements: Activation      Perlita Jacobson  01/13/20  12:41 PM

## 2020-01-27 ENCOUNTER — OFFICE VISIT (OUTPATIENT)
Dept: UROLOGY | Facility: CLINIC | Age: 60
End: 2020-01-27
Payer: COMMERCIAL

## 2020-01-27 VITALS — HEART RATE: 112 BPM | DIASTOLIC BLOOD PRESSURE: 79 MMHG | SYSTOLIC BLOOD PRESSURE: 138 MMHG

## 2020-01-27 DIAGNOSIS — N39.3 MALE URINARY STRESS INCONTINENCE: Primary | ICD-10-CM

## 2020-01-27 ASSESSMENT — PAIN SCALES - GENERAL: PAINLEVEL: NO PAIN (0)

## 2020-01-27 NOTE — LETTER
1/27/2020       RE: Jesus Landa  [address] [address] [address]     Dear Colleague,    Thank you for referring your patient, Jesus Landa, to the [facility] UROLOGY AND INST FOR PROSTATE AND UROLOGIC CANCERS at [facility]. Please see a copy of my visit note below.    Jesus Landa is a 59 year old male who is 2.5 weeks s/p AUS removal and replacement all components. He feels well. No more pain. Persistent leakage.      Exam:  /79 (BP Location: Right arm, Patient Position: Sitting, Cuff Size: Adult Regular)   Pulse 112   Incision clean, dry, intact  No tenderness or evidence of cellulitis  No hematoma  Sutures are intact  Scrotal pump is palpable without evidence of infection. Activated today.      A/P   Excellent outcome after AUS revision    Medication plan: None    F/U:   RTC in April 2020 for uroflow/PVR to evaluate for stricture progression given the 14Fr thin stricture noted at time of repair    Again, thank you for allowing me to participate in the care of your patient.      Sincerely,    Xiao Allan MD

## 2020-01-27 NOTE — PATIENT INSTRUCTIONS
Follow up with Dr. Allan in 3 months with flow/PVR.  Please come to this appointment with a comfortably fully bladder.        It was a pleasure meeting with you today.  Thank you for allowing me and my team the privilege of caring for you today.  YOU are the reason we are here, and I truly hope we provided you with the excellent service you deserve.  Please let us know if there is anything else we can do for you so that we can be sure you are leaving completely satisfied with your care experience.

## 2020-01-27 NOTE — NURSING NOTE
Chief Complaint   Patient presents with     Follow Up     3 week post op       Blood pressure 138/79, pulse 112. There is no height or weight on file to calculate BMI.    Patient Active Problem List   Diagnosis     Malignant neoplasm of prostate (H)     Prostate cancer (H)     History of colon polyps     Male urinary stress incontinence       No Known Allergies    Current Outpatient Medications   Medication Sig Dispense Refill     acetaminophen (TYLENOL) 325 MG tablet Take 2 tablets (650 mg) by mouth every 4 hours as needed for other (mild pain) 100 tablet 0     aspirin 81 MG tablet Take by mouth daily Patient not taking       cetirizine (ZYRTEC) 10 MG tablet Take 10 mg by mouth daily       ibuprofen (ADVIL/MOTRIN) 400 MG tablet        losartan (COZAAR) 50 MG tablet daily        SUMAtriptan (IMITREX) 50 MG tablet        senna (SENOKOT) 8.6 MG tablet Take 1 tablet by mouth daily (Patient not taking: Reported on 1/27/2020) 10 tablet 0       Social History     Tobacco Use     Smoking status: Never Smoker     Smokeless tobacco: Never Used   Substance Use Topics     Alcohol use: No     Drug use: No       RAMSES Irving  1/27/2020  1:33 PM

## 2020-01-27 NOTE — PROGRESS NOTES
Jesus Landa is a 59 year old male who is 2.5 weeks s/p AUS removal and replacement all components. He feels well. No more pain. Persistent leakage.      Exam:  /79 (BP Location: Right arm, Patient Position: Sitting, Cuff Size: Adult Regular)   Pulse 112   Incision clean, dry, intact  No tenderness or evidence of cellulitis  No hematoma  Sutures are intact  Scrotal pump is palpable without evidence of infection. Activated today.      A/P   Excellent outcome after AUS revision    Medication plan: None    F/U:   RTC in April 2020 for uroflow/PVR to evaluate for stricture progression given the 14Fr thin stricture noted at time of repair

## 2020-03-02 ENCOUNTER — HEALTH MAINTENANCE LETTER (OUTPATIENT)
Age: 60
End: 2020-03-02

## 2020-12-14 ENCOUNTER — HEALTH MAINTENANCE LETTER (OUTPATIENT)
Age: 60
End: 2020-12-14

## 2021-04-18 ENCOUNTER — HEALTH MAINTENANCE LETTER (OUTPATIENT)
Age: 61
End: 2021-04-18

## 2021-10-02 ENCOUNTER — HEALTH MAINTENANCE LETTER (OUTPATIENT)
Age: 61
End: 2021-10-02

## 2022-05-14 ENCOUNTER — HEALTH MAINTENANCE LETTER (OUTPATIENT)
Age: 62
End: 2022-05-14

## 2022-09-03 ENCOUNTER — HEALTH MAINTENANCE LETTER (OUTPATIENT)
Age: 62
End: 2022-09-03

## 2023-01-03 NOTE — TELEPHONE ENCOUNTER
MEDICAL RECORDS REQUEST   [facility]  Urology Clinic  [address] [place] [address]  PHONE: [phone]  Fax: [phone]        FUTURE VISIT INFORMATION                                                   Jesus Landa, : 1960 scheduled for future visit at [facility]    APPOINTMENT INFORMATION:    Date: 2023    Provider:  Rm Mitchell MD    Reason for Visit/Diagnosis: Artificial urinary sphincter follow up      RECORDS REQUESTED FOR VISIT                                                     NOTES  STATUS/DETAILS   OFFICE NOTE from other specialist  yes, 2020, 2019 -- Xiao Allan MD @ [facility]    2018, 10/23/2017 -- Rm Mitchell MD @ [facility]         DISCHARGE SUMMARY from hospital  yes, 2020 -- [facility]   PROCEDURE VISIT  yes, 2020, 12/15/2017   OPERATIVE REPORT  yes, 12/15/2017   MEDICATION LIST  yes     PRE-VISIT CHECKLIST      Record collection complete Yes   Appointment appropriately scheduled           (right time/right provider) Yes   Joint diagnostic appointment coordinated correctly          (ensure right order & amount of time) Yes   MyChart activation Yes   Questionnaire complete If no, please explain PENDING

## 2023-02-01 ENCOUNTER — PRE VISIT (OUTPATIENT)
Dept: UROLOGY | Facility: CLINIC | Age: 63
End: 2023-02-01
Payer: COMMERCIAL

## 2023-02-01 NOTE — TELEPHONE ENCOUNTER
Reason for visit: AUS follow up     Relevant information: pt reports no issues, hematuria once or twice    Records/imaging/labs/orders: records available    Pt called: yes, pt moved to chivo Monte CMA  2/1/2023  10:14 AM

## 2023-02-06 ENCOUNTER — PRE VISIT (OUTPATIENT)
Dept: UROLOGY | Facility: CLINIC | Age: 63
End: 2023-02-06

## 2023-02-06 ENCOUNTER — VIRTUAL VISIT (OUTPATIENT)
Dept: UROLOGY | Facility: CLINIC | Age: 63
End: 2023-02-06
Payer: COMMERCIAL

## 2023-02-06 DIAGNOSIS — R31.0 GROSS HEMATURIA: Primary | ICD-10-CM

## 2023-02-06 PROCEDURE — 99204 OFFICE O/P NEW MOD 45 MIN: CPT | Mod: 95 | Performed by: UROLOGY

## 2023-02-06 NOTE — PROGRESS NOTES
Video-Visit Details    Type of service:  Video Visit    Video Start Time (time video started): 7:47    Video End Time (time video stopped): 7:55    Originating Location (pt. Location): Home    Distant Location (provider location):  Off-site    Mode of Communication:  Video Conference via [email or web address]

## 2023-02-06 NOTE — NURSING NOTE
Jesus Landa  is being evaluated via a billable video visit.      How would you like to obtain your AVS? [email or web address]  For the video visit, send the invitation by: Text to cell phone: [phone]  Will anyone else be joining your video visit? Olamide Saleh,   Virtual Visit Facilitator

## 2023-02-06 NOTE — LETTER
2/6/2023       RE: Jesus Landa  [address] [address] [address]     Dear Colleague,    Thank you for referring your patient, Jesus Landa, to the [facility] UROLOGY CLINIC [place] at [facility]. Please see a copy of my visit note below.    Video-Visit Details    Type of service:  Video Visit    Video Start Time (time video started): 7:47    Video End Time (time video stopped): 7:55    Originating Location (pt. Location): Home    Distant Location (provider location):  Off-site    Mode of Communication:  Video Conference via [employer]          HPI:  Jesus Landa is a 62 year old male being seen for f/u after re-do AUS.    2/6/23: 2-3 episodes of gross hematuria in last year. 1-2 pads a days of incontinence. Feels like he has a slightly weak stream but no complaints.   1/2/20: Removal and replacement of all components of AUS due to cuff leak; 4cm cuff placed at same location. 14F stricture noted proximal to cuff.    2017: AUS  2015: EBRT  2013: LARP    Exam:  There were no vitals taken for this visit.  GENERAL: Healthy, alert and no distress  EYES: Eyes grossly normal to inspection.  No discharge or erythema, or obvious scleral/conjunctival abnormalities.  RESP: No audible wheeze, cough, or visible cyanosis.  No visible retractions or increased work of breathing.    SKIN: Visible skin clear. No significant rash, abnormal pigmentation or lesions.  NEURO: Cranial nerves grossly intact.  Mentation and speech appropriate for age.  PSYCH: Mentation appears normal, affect normal/bright, judgement and insight intact, normal speech and appearance well-groomed.    Review of Imaging:  The following imaging exams were independently viewed and interpreted by me and discussed with patient:  none    Review of Labs:  The following labs were reviewed by me and discussed with the patient:  none    Assessment & Plan   1. Gross hematuria in a man with prior  pelvic EBRT and known urethral stricture and AUS. Plan UA, UC, Cr, CT A/P and cysto with flex ureteroscope.     Rm Mitchell MD  [facility] UROLOGY CLINIC [place]      ==========================      Additional Coding Information:    Problems:  4 -- one undiagnosed new problem with uncertain prognosis    Data Reviewed      Tests ordered: 5    Level of risk:  3 -- low risk (e.g., OTC medication or observation, minor surgery without risks)    Time spent:  15 minutes spent on the date of the encounter doing chart review, history and exam, documentation and further activities per the note

## 2023-02-06 NOTE — PROGRESS NOTES
HPI:  Jesus Landa is a 62 year old male being seen for f/u after re-do AUS.    2/6/23: 2-3 episodes of gross hematuria in last year. 1-2 pads a days of incontinence. Feels like he has a slightly weak stream but no complaints.   1/2/20: Removal and replacement of all components of AUS due to cuff leak; 4cm cuff placed at same location. 14F stricture noted proximal to cuff.    2017: AUS  2015: EBRT  2013: LARP    Exam:  There were no vitals taken for this visit.  GENERAL: Healthy, alert and no distress  EYES: Eyes grossly normal to inspection.  No discharge or erythema, or obvious scleral/conjunctival abnormalities.  RESP: No audible wheeze, cough, or visible cyanosis.  No visible retractions or increased work of breathing.    SKIN: Visible skin clear. No significant rash, abnormal pigmentation or lesions.  NEURO: Cranial nerves grossly intact.  Mentation and speech appropriate for age.  PSYCH: Mentation appears normal, affect normal/bright, judgement and insight intact, normal speech and appearance well-groomed.    Review of Imaging:  The following imaging exams were independently viewed and interpreted by me and discussed with patient:  none    Review of Labs:  The following labs were reviewed by me and discussed with the patient:  none    Assessment & Plan   1. Gross hematuria in a man with prior pelvic EBRT and known urethral stricture and AUS. Plan UA, UC, Cr, CT A/P and cysto with flex ureteroscope.     Rm Mitchell MD  [facility] UROLOGY CLINIC [place]      ==========================      Additional Coding Information:    Problems:  4 -- one undiagnosed new problem with uncertain prognosis    Data Reviewed      Tests ordered: 5    Level of risk:  3 -- low risk (e.g., OTC medication or observation, minor surgery without risks)    Time spent:  15 minutes spent on the date of the encounter doing chart review, history and exam, documentation and further activities per the note

## 2023-02-16 ENCOUNTER — TELEPHONE (OUTPATIENT)
Dept: UROLOGY | Facility: CLINIC | Age: 63
End: 2023-02-16
Payer: COMMERCIAL

## 2023-02-16 NOTE — TELEPHONE ENCOUNTER
----- Message from Radha Monte CMA sent at 2/15/2023  1:28 PM CST -----  Regarding: RE: Dr Mitchell 2/6 2/20/23 at 1200 with Dr. Aleman was open. I held it.     3/13/23 at 2:15 with Dr. Mitchell.    3/20/23 at 12:30      ----- Message -----  From: Dinah Cherry  Sent: 2/15/2023   1:18 PM CST  To: Radha Monte CMA  Subject: Dr Mitchell 2/6                                   Any urgency? Stephen next cysto is June and Ash is May. Please advise :-)    CT, Cr, UA/UC and cysto with me (using peds scope or ureteroscope)

## 2023-03-10 ENCOUNTER — PRE VISIT (OUTPATIENT)
Dept: UROLOGY | Facility: CLINIC | Age: 63
End: 2023-03-10
Payer: COMMERCIAL

## 2023-03-11 NOTE — TELEPHONE ENCOUNTER
Reason for visit: Cystoscopy with peds scope     Relevant information: AUS with hematuria    Records/imaging/labs/orders: all records available    Pt called: No need for a call    At Rooming: no need for a call    Radha Monte CMA  3/10/2023  10:09 PM

## 2023-03-13 ENCOUNTER — OFFICE VISIT (OUTPATIENT)
Dept: UROLOGY | Facility: CLINIC | Age: 63
End: 2023-03-13
Payer: COMMERCIAL

## 2023-03-13 VITALS — DIASTOLIC BLOOD PRESSURE: 83 MMHG | HEART RATE: 80 BPM | SYSTOLIC BLOOD PRESSURE: 150 MMHG

## 2023-03-13 DIAGNOSIS — R31.0 GROSS HEMATURIA: Primary | ICD-10-CM

## 2023-03-13 PROCEDURE — 52000 CYSTOURETHROSCOPY: CPT | Performed by: UROLOGY

## 2023-03-13 ASSESSMENT — PAIN SCALES - GENERAL: PAINLEVEL: NO PAIN (0)

## 2023-03-13 NOTE — PROGRESS NOTES
Male Cystoscopy Procedure Note     PRE-PROCEDURE DIAGNOSIS: s/p AUS; weak stream; gross hematuria  POST-PROCEDURE DIAGNOSIS: radiation cystitis  PROCEDURE: cystoscopy    HISTORY: Jesus Landa is a 62 year old man with weak stream and intermittent GH after re-do AUS. H/o stricture    2/6/23: 2-3 episodes of gross hematuria in last year. 1-2 pads a days of incontinence. Feels like he has a slightly weak stream but no complaints.   1/2/20: Removal and replacement of all components of AUS due to cuff leak; downsized to 4cm cuff placed at same location. 14F stricture noted proximal to cuff.    2017: AUS, 5cm cuff  2015: EBRT  2013: LARP    REVIEW OF OFFICE STUDIES:        DESCRIPTION OF PROCEDURE:  After informed consent was obtained, the patient was brought to the procedure room where he was placed in the supine position with all pressure points well padded.  The penis and scrotum were prepped and draped in a sterile fashion. A flexible ureteroscope was introduced through a well-lubricated urethra.  The anterior urethra was free of stricture. The urinary sphincter was weak and the AUS had good coaptation with no erosion or atrophy. I did not see a stricture proximal to this as had been described before. The prostate demonstrated removal.  The membranous urethra had several blood vessels that seem to bleed easily.  The bladder neck was open. Bladder was free of diverticuli, cellules, trabeculation, tumors or stones.there was a moderate amount of hypertrophic vessels consistent with radiation cystitis.  These are mostly on the floor and posterior wall the bladder.  The flexible cystoscope was removed and the findings were described to the patient.   ASSESSMENT AND PLAN:  62 year old man with recent gross hematuria episodes x2.  Each of these were limited to a single void.  So in discussion with the patient we decided not to sign him up for hyperbaric oxygen therapy just yet.  He was instructed that if the hematuria  episode should worsen then he should call and notify us and we will get him into hyperbaric oxygen therapy.  His artificial sphincter and his prior urethral stricture both look in good shape.  He can follow-up with me as needed.

## 2023-03-13 NOTE — PATIENT INSTRUCTIONS
"Please follow up as needed.    It was a pleasure meeting with you today.  Thank you for allowing me and my team the privilege of caring for you today.  YOU are the reason we are here, and I truly hope we provided you with the excellent service you deserve.  Please let us know if there is anything else we can do for you so that we can be sure you are leaving completely satisfied with your care experience.       AFTER YOUR CYSTOSCOPY        You have just completed a cystoscopy, or \"cysto\", which allowed your physician to learn more about your bladder (or to remove a stent placed after surgery). We suggest that you continue to avoid caffeine, fruit juice, and alcohol for the next 24 hours, however, you are encouraged to return to your normal activities.         A few things that are considered normal after your cystoscopy:     * Small amount of bleeding (or spotting) that clears within the next 24 hours     * Slight burning sensation with urination     * Sensation to of needing to avoid more frequently     * The feeling of \"air\" in your urine     * Mild discomfort that is relieved with Tylenol        Please contact our office promptly if you:     * Develop a fever above 101 degrees     * Are unable to urinate     * Develop bright red blood that does not stop     * Severe pain or swelling         Please contact [facility] with any concerns or questions [email or web address].  "

## 2023-03-13 NOTE — LETTER
3/13/2023       RE: Jesus Landa  [address] [address] [address]     Dear Colleague,    Thank you for referring your patient, Jesus Landa, to the [facility] UROLOGY CLINIC [place] at [facility]. Please see a copy of my visit note below.    Male Cystoscopy Procedure Note     PRE-PROCEDURE DIAGNOSIS: s/p AUS; weak stream; gross hematuria  POST-PROCEDURE DIAGNOSIS: radiation cystitis  PROCEDURE: cystoscopy    HISTORY: Jesus Landa is a 62 year old man with weak stream and intermittent GH after re-do AUS. H/o stricture    2/6/23: 2-3 episodes of gross hematuria in last year. 1-2 pads a days of incontinence. Feels like he has a slightly weak stream but no complaints.   1/2/20: Removal and replacement of all components of AUS due to cuff leak; downsized to 4cm cuff placed at same location. 14F stricture noted proximal to cuff.    2017: AUS, 5cm cuff  2015: EBRT  2013: LARP    REVIEW OF OFFICE STUDIES:        DESCRIPTION OF PROCEDURE:  After informed consent was obtained, the patient was brought to the procedure room where he was placed in the supine position with all pressure points well padded.  The penis and scrotum were prepped and draped in a sterile fashion. A flexible ureteroscope was introduced through a well-lubricated urethra.  The anterior urethra was free of stricture. The urinary sphincter was weak and the AUS had good coaptation with no erosion or atrophy. I did not see a stricture proximal to this as had been described before. The prostate demonstrated removal.  The membranous urethra had several blood vessels that seem to bleed easily.  The bladder neck was open. Bladder was free of diverticuli, cellules, trabeculation, tumors or stones.there was a moderate amount of hypertrophic vessels consistent with radiation cystitis.  These are mostly on the floor and posterior wall the bladder.  The flexible cystoscope was removed and the  findings were described to the patient.   ASSESSMENT AND PLAN:  62 year old man with recent gross hematuria episodes x2.  Each of these were limited to a single void.  So in discussion with the patient we decided not to sign him up for hyperbaric oxygen therapy just yet.  He was instructed that if the hematuria episode should worsen then he should call and notify us and we will get him into hyperbaric oxygen therapy.  His artificial sphincter and his prior urethral stricture both look in good shape.  He can follow-up with me as needed.    Sincerely,    Rm Mitchell MD

## 2023-07-10 ENCOUNTER — MYC MEDICAL ADVICE (OUTPATIENT)
Dept: UROLOGY | Facility: CLINIC | Age: 63
End: 2023-07-10
Payer: COMMERCIAL

## 2023-07-10 DIAGNOSIS — R31.0 GROSS HEMATURIA: Primary | ICD-10-CM

## 2023-07-11 NOTE — PROGRESS NOTES
Referral faxed to [facility] with [facility] today.  Located in [place], [place].  Fax # [phone]   Phone # [phone]    Pt was notified of referral sent today via my chart message.    Nancy Villagomez RN

## 2024-02-17 ENCOUNTER — HEALTH MAINTENANCE LETTER (OUTPATIENT)
Age: 64
End: 2024-02-17

## 2025-02-09 ENCOUNTER — HEALTH MAINTENANCE LETTER (OUTPATIENT)
Age: 65
End: 2025-02-09

## 2025-06-01 ENCOUNTER — HEALTH MAINTENANCE LETTER (OUTPATIENT)
Age: 65
End: 2025-06-01

## (undated) DEVICE — LINEN GOWN XLG 5407

## (undated) DEVICE — JELLY LUBRICATING SURGILUBE 2OZ TUBE

## (undated) DEVICE — DRAPE MAYO STAND 23X54 8337

## (undated) DEVICE — SOL WATER IRRIG 1000ML BOTTLE 2F7114

## (undated) DEVICE — DRAPE LEGGINGS CLEAR 8430

## (undated) DEVICE — GLOVE PROTEXIS W/NEU-THERA 8.0  2D73TE80

## (undated) DEVICE — ESU GROUND PAD ADULT W/CORD E7507

## (undated) DEVICE — LINEN TOWEL PACK X6 WHITE 5487

## (undated) DEVICE — SYR 30ML LL W/O NDL

## (undated) DEVICE — SOL WATER 3000ML BAG 7973-08

## (undated) DEVICE — PAD CHUX UNDERPAD 23X24" 7136

## (undated) DEVICE — TUBING IRRIG CYSTO/BLADDER SET 81" LF 2C4040

## (undated) DEVICE — PANTIES MESH LG/XLG 2PK 706M2

## (undated) DEVICE — SUCTION MANIFOLD DORNOCH ULTRA CART UL-CL500

## (undated) DEVICE — Device

## (undated) DEVICE — DRSG GAUZE 4X4" TRAY 6939

## (undated) DEVICE — DRSG PRIMAPORE 02X3" 7133

## (undated) DEVICE — BLADE CLIPPER SGL USE 9680

## (undated) DEVICE — SYR 10ML LL W/O NDL 302995

## (undated) DEVICE — TUBING SUCTION 10'X3/16" N510

## (undated) DEVICE — SYR 10ML LL W/O NDL

## (undated) DEVICE — DRAPE SLEEVE 599

## (undated) DEVICE — STPL SKIN 35W ROTATING HEAD PRW35

## (undated) DEVICE — SUCTION MANIFOLD NEPTUNE 2 SYS 4 PORT 0702-020-000

## (undated) DEVICE — DRAPE POUCH INSTRUMENT 1018

## (undated) DEVICE — SU VICRYL 3-0 SH 27" UND J416H

## (undated) DEVICE — NDL BLUNT 18GA 1" W/O FILTER 305181

## (undated) DEVICE — BLADE KNIFE SURG 15 371115

## (undated) DEVICE — DRAPE GYN/UROLOGY FLUID POUCH TUR 29455

## (undated) DEVICE — SYR 30ML LL W/O NDL 302832

## (undated) DEVICE — GLOVE PROTEXIS W/NEU-THERA 7.5  2D73TE75

## (undated) DEVICE — ADH SKIN CLOSURE PREMIERPRO EXOFIN 1.0ML 3470

## (undated) DEVICE — SU MONOCRYL 4-0 PS-2 18" UND Y496G

## (undated) DEVICE — CONNECTOR WATER VALVE PERFUSION PACK STR 020272801

## (undated) DEVICE — SOL BENZOIN 0.5OZ

## (undated) DEVICE — SYR 50ML LL W/O NDL 309653

## (undated) DEVICE — SU MONOCRYL 4-0 PS-2 27" UND Y426H

## (undated) DEVICE — SOL WATER IRRIG 500ML BOTTLE 2F7113

## (undated) DEVICE — ADHESIVE SWIFTSET 0.8ML OCTYL SS6

## (undated) DEVICE — DRSG STERI STRIP 1/2X4" R1547

## (undated) DEVICE — SU SILK 3-0 SH 30" K832H

## (undated) DEVICE — DRAPE SHEET MED 44X70" 9355

## (undated) DEVICE — PAD CHUX UNDERPAD 30X30"

## (undated) DEVICE — PREP SKIN SCRUB TRAY 4461A

## (undated) DEVICE — KIT ENDO FIRST STEP DISINFECTANT 200ML W/POUCH EP-4

## (undated) DEVICE — LINEN TOWEL PACK X5 5464

## (undated) DEVICE — SU VICRYL 4-0 RB-1 27" J304

## (undated) DEVICE — CATH PLUG W/CAP 000076

## (undated) DEVICE — SOL NACL 0.9% IRRIG 1000ML BOTTLE 2F7124

## (undated) DEVICE — SUPPORTER ATHLETIC LG LATEX 202636

## (undated) DEVICE — CATH FOLEY 12FR 5ML SIL LUBRISIL IC 1758SI12

## (undated) DEVICE — BAG URINARY DRAIN LUBRISIL IC 4000ML LF 253509A

## (undated) DEVICE — PREP POVIDONE IODINE SCRUB 7.5% 4OZ APL82212

## (undated) DEVICE — PACK ENT MINOR CUSTOM ASC

## (undated) DEVICE — PACK CYSTO CUSTOM ASC

## (undated) RX ORDER — PROPOFOL 10 MG/ML
INJECTION, EMULSION INTRAVENOUS
Status: DISPENSED
Start: 2020-01-02

## (undated) RX ORDER — PROPOFOL 10 MG/ML
INJECTION, EMULSION INTRAVENOUS
Status: DISPENSED
Start: 2017-12-15

## (undated) RX ORDER — LIDOCAINE HYDROCHLORIDE 20 MG/ML
JELLY TOPICAL
Status: DISPENSED
Start: 2019-09-09

## (undated) RX ORDER — FENTANYL CITRATE 50 UG/ML
INJECTION, SOLUTION INTRAMUSCULAR; INTRAVENOUS
Status: DISPENSED
Start: 2017-12-15

## (undated) RX ORDER — CEFAZOLIN SODIUM 2 G/100ML
INJECTION, SOLUTION INTRAVENOUS
Status: DISPENSED
Start: 2020-01-02

## (undated) RX ORDER — OXYCODONE HYDROCHLORIDE 5 MG/1
TABLET ORAL
Status: DISPENSED
Start: 2020-01-02

## (undated) RX ORDER — GLYCOPYRROLATE 0.2 MG/ML
INJECTION, SOLUTION INTRAMUSCULAR; INTRAVENOUS
Status: DISPENSED
Start: 2020-01-02

## (undated) RX ORDER — FENTANYL CITRATE 50 UG/ML
INJECTION, SOLUTION INTRAMUSCULAR; INTRAVENOUS
Status: DISPENSED
Start: 2020-01-02

## (undated) RX ORDER — CEFAZOLIN SODIUM 1 G/3ML
INJECTION, POWDER, FOR SOLUTION INTRAMUSCULAR; INTRAVENOUS
Status: DISPENSED
Start: 2017-12-15

## (undated) RX ORDER — ACETAMINOPHEN 325 MG/1
TABLET ORAL
Status: DISPENSED
Start: 2017-12-15

## (undated) RX ORDER — ONDANSETRON 2 MG/ML
INJECTION INTRAMUSCULAR; INTRAVENOUS
Status: DISPENSED
Start: 2020-01-02

## (undated) RX ORDER — HYDROMORPHONE HYDROCHLORIDE 1 MG/ML
INJECTION, SOLUTION INTRAMUSCULAR; INTRAVENOUS; SUBCUTANEOUS
Status: DISPENSED
Start: 2017-12-15

## (undated) RX ORDER — LIDOCAINE HYDROCHLORIDE 20 MG/ML
INJECTION, SOLUTION EPIDURAL; INFILTRATION; INTRACAUDAL; PERINEURAL
Status: DISPENSED
Start: 2020-01-02

## (undated) RX ORDER — GABAPENTIN 300 MG/1
CAPSULE ORAL
Status: DISPENSED
Start: 2017-12-15